# Patient Record
Sex: MALE | Race: WHITE | NOT HISPANIC OR LATINO | Employment: FULL TIME | URBAN - METROPOLITAN AREA
[De-identification: names, ages, dates, MRNs, and addresses within clinical notes are randomized per-mention and may not be internally consistent; named-entity substitution may affect disease eponyms.]

---

## 2020-11-20 ENCOUNTER — HOSPITAL ENCOUNTER (EMERGENCY)
Facility: HOSPITAL | Age: 30
Discharge: HOME | End: 2020-11-21
Attending: EMERGENCY MEDICINE | Admitting: EMERGENCY MEDICINE

## 2020-11-20 VITALS
SYSTOLIC BLOOD PRESSURE: 139 MMHG | HEART RATE: 119 BPM | TEMPERATURE: 97.8 F | OXYGEN SATURATION: 94 % | HEIGHT: 68 IN | WEIGHT: 180 LBS | RESPIRATION RATE: 20 BRPM | BODY MASS INDEX: 27.28 KG/M2 | DIASTOLIC BLOOD PRESSURE: 102 MMHG

## 2020-11-20 DIAGNOSIS — F10.10 ALCOHOL ABUSE: Primary | ICD-10-CM

## 2020-11-20 DIAGNOSIS — R74.8 ELEVATED LIVER ENZYMES: ICD-10-CM

## 2020-11-20 LAB
ALBUMIN SERPL-MCNC: 5 G/DL (ref 3.5–5.2)
ALBUMIN/GLOB SERPL: 1.7 G/DL
ALP SERPL-CCNC: 104 U/L (ref 39–117)
ALT SERPL W P-5'-P-CCNC: 61 U/L (ref 1–41)
AMPHET+METHAMPHET UR QL: NEGATIVE
AMPHETAMINES UR QL: NEGATIVE
ANION GAP SERPL CALCULATED.3IONS-SCNC: 15 MMOL/L (ref 5–15)
AST SERPL-CCNC: 68 U/L (ref 1–40)
BACTERIA UR QL AUTO: ABNORMAL /HPF
BARBITURATES UR QL SCN: NEGATIVE
BASOPHILS # BLD AUTO: 0.05 10*3/MM3 (ref 0–0.2)
BASOPHILS NFR BLD AUTO: 0.9 % (ref 0–1.5)
BENZODIAZ UR QL SCN: NEGATIVE
BILIRUB SERPL-MCNC: 0.8 MG/DL (ref 0–1.2)
BILIRUB UR QL STRIP: ABNORMAL
BUN SERPL-MCNC: 9 MG/DL (ref 6–20)
BUN/CREAT SERPL: 9.8 (ref 7–25)
BUPRENORPHINE SERPL-MCNC: NEGATIVE NG/ML
CALCIUM SPEC-SCNC: 9.8 MG/DL (ref 8.6–10.5)
CANNABINOIDS SERPL QL: POSITIVE
CHLORIDE SERPL-SCNC: 99 MMOL/L (ref 98–107)
CLARITY UR: ABNORMAL
CO2 SERPL-SCNC: 22 MMOL/L (ref 22–29)
COCAINE UR QL: NEGATIVE
COLOR UR: ABNORMAL
CREAT SERPL-MCNC: 0.92 MG/DL (ref 0.76–1.27)
DEPRECATED RDW RBC AUTO: 41.3 FL (ref 37–54)
EOSINOPHIL # BLD AUTO: 0.03 10*3/MM3 (ref 0–0.4)
EOSINOPHIL NFR BLD AUTO: 0.6 % (ref 0.3–6.2)
ERYTHROCYTE [DISTWIDTH] IN BLOOD BY AUTOMATED COUNT: 12.5 % (ref 12.3–15.4)
ETHANOL BLD-MCNC: <10 MG/DL (ref 0–10)
GFR SERPL CREATININE-BSD FRML MDRD: 118 ML/MIN/1.73
GFR SERPL CREATININE-BSD FRML MDRD: 97 ML/MIN/1.73
GLOBULIN UR ELPH-MCNC: 3 GM/DL
GLUCOSE SERPL-MCNC: 133 MG/DL (ref 65–99)
GLUCOSE UR STRIP-MCNC: NEGATIVE MG/DL
HCT VFR BLD AUTO: 51.6 % (ref 37.5–51)
HGB BLD-MCNC: 17.6 G/DL (ref 13–17.7)
HGB UR QL STRIP.AUTO: ABNORMAL
HOLD SPECIMEN: NORMAL
HOLD SPECIMEN: NORMAL
HYALINE CASTS UR QL AUTO: ABNORMAL /LPF
IMM GRANULOCYTES # BLD AUTO: 0.02 10*3/MM3 (ref 0–0.05)
IMM GRANULOCYTES NFR BLD AUTO: 0.4 % (ref 0–0.5)
KETONES UR QL STRIP: ABNORMAL
LEUKOCYTE ESTERASE UR QL STRIP.AUTO: ABNORMAL
LIPASE SERPL-CCNC: 15 U/L (ref 13–60)
LYMPHOCYTES # BLD AUTO: 0.98 10*3/MM3 (ref 0.7–3.1)
LYMPHOCYTES NFR BLD AUTO: 18.5 % (ref 19.6–45.3)
MCH RBC QN AUTO: 31 PG (ref 26.6–33)
MCHC RBC AUTO-ENTMCNC: 34.1 G/DL (ref 31.5–35.7)
MCV RBC AUTO: 90.8 FL (ref 79–97)
METHADONE UR QL SCN: NEGATIVE
MONOCYTES # BLD AUTO: 0.53 10*3/MM3 (ref 0.1–0.9)
MONOCYTES NFR BLD AUTO: 10 % (ref 5–12)
MUCOUS THREADS URNS QL MICRO: ABNORMAL /HPF
NEUTROPHILS NFR BLD AUTO: 3.69 10*3/MM3 (ref 1.7–7)
NEUTROPHILS NFR BLD AUTO: 69.6 % (ref 42.7–76)
NITRITE UR QL STRIP: NEGATIVE
NRBC BLD AUTO-RTO: 0 /100 WBC (ref 0–0.2)
OPIATES UR QL: NEGATIVE
OXYCODONE UR QL SCN: NEGATIVE
PCP UR QL SCN: NEGATIVE
PH UR STRIP.AUTO: 5.5 [PH] (ref 5–8)
PLATELET # BLD AUTO: 268 10*3/MM3 (ref 140–450)
PMV BLD AUTO: 8.8 FL (ref 6–12)
POTASSIUM SERPL-SCNC: 3.8 MMOL/L (ref 3.5–5.2)
PROPOXYPH UR QL: NEGATIVE
PROT SERPL-MCNC: 8 G/DL (ref 6–8.5)
PROT UR QL STRIP: ABNORMAL
RBC # BLD AUTO: 5.68 10*6/MM3 (ref 4.14–5.8)
RBC # UR: ABNORMAL /HPF
REF LAB TEST METHOD: ABNORMAL
SODIUM SERPL-SCNC: 136 MMOL/L (ref 136–145)
SP GR UR STRIP: 1.03 (ref 1–1.03)
SQUAMOUS #/AREA URNS HPF: ABNORMAL /HPF
TRICYCLICS UR QL SCN: NEGATIVE
UROBILINOGEN UR QL STRIP: ABNORMAL
WBC # BLD AUTO: 5.3 10*3/MM3 (ref 3.4–10.8)
WBC UR QL AUTO: ABNORMAL /HPF
WHOLE BLOOD HOLD SPECIMEN: NORMAL
WHOLE BLOOD HOLD SPECIMEN: NORMAL

## 2020-11-20 PROCEDURE — 96365 THER/PROPH/DIAG IV INF INIT: CPT

## 2020-11-20 PROCEDURE — 80053 COMPREHEN METABOLIC PANEL: CPT | Performed by: EMERGENCY MEDICINE

## 2020-11-20 PROCEDURE — 99283 EMERGENCY DEPT VISIT LOW MDM: CPT

## 2020-11-20 PROCEDURE — 85025 COMPLETE CBC W/AUTO DIFF WBC: CPT | Performed by: EMERGENCY MEDICINE

## 2020-11-20 PROCEDURE — 83690 ASSAY OF LIPASE: CPT | Performed by: EMERGENCY MEDICINE

## 2020-11-20 PROCEDURE — 25010000002 THIAMINE PER 100 MG: Performed by: EMERGENCY MEDICINE

## 2020-11-20 PROCEDURE — 81001 URINALYSIS AUTO W/SCOPE: CPT | Performed by: EMERGENCY MEDICINE

## 2020-11-20 PROCEDURE — 25010000002 MAGNESIUM SULFATE PER 500 MG OF MAGNESIUM: Performed by: EMERGENCY MEDICINE

## 2020-11-20 PROCEDURE — 80307 DRUG TEST PRSMV CHEM ANLYZR: CPT

## 2020-11-20 RX ADMIN — THIAMINE HYDROCHLORIDE 1000 ML/HR: 100 INJECTION, SOLUTION INTRAMUSCULAR; INTRAVENOUS at 20:43

## 2020-11-21 NOTE — ED PROVIDER NOTES
"Subjective   Mr. Robosn Miller is a 29 y.o. male who presents to the ED with c/o alcohol abuse. He reports he has been drinking alcohol for several years and has been drinking more over the past 2 months. He reports he has been drinking 1/2 a fifth of alcohol daily or more. He has been experiencing abdominal swelling and discomfort. He presents today because he is concerned for liver disease. He does not express desire to stop drinking. He has had a weight gain recently. He denies suicidal ideation, homicidal ideation, nausea, vomiting, chest pain, or shortness of breath. There are no other acute symptoms at this time.      History provided by:  Patient  Alcohol Problem  Location:  Abdominal  Severity:  Moderate  Onset quality:  Sudden  Timing:  Constant  Progression:  Worsening  Chronicity:  Chronic  Relieved by:  None tried  Worsened by:  Nothing  Ineffective treatments:  None tried  Associated symptoms: abdominal pain    Associated symptoms: no chest pain, no cough, no fever, no nausea, no shortness of breath and no vomiting        Review of Systems   Constitutional: Negative for chills and fever.   Respiratory: Negative for cough and shortness of breath.    Cardiovascular: Negative for chest pain.   Gastrointestinal: Positive for abdominal pain. Negative for nausea and vomiting.   Genitourinary: Negative for dysuria and frequency.   Neurological: Negative for dizziness and weakness.   Psychiatric/Behavioral: Negative for suicidal ideas.   All other systems reviewed and are negative.      History reviewed. No pertinent past medical history.    Allergies   Allergen Reactions   • Ceclor [Cefaclor] Other (See Comments)     \" I DONT KNOW I WAS AN INFANT WHEN I GOT IT\"    • Doxycycline Other (See Comments)     \"MAKES ME FEEL VERY ILL\"   • Penicillins Other (See Comments)     \"I DO NOT KNOW I WAS GIVEN THIS AS A CHILD\"    • Sulfa Antibiotics Other (See Comments)     \" I DONT KNOW I WAS AN INFANT WHEN I WAS GIVEN IT\"  "       Past Surgical History:   Procedure Laterality Date   • WRIST FRACTURE SURGERY         History reviewed. No pertinent family history.    Social History     Socioeconomic History   • Marital status: Single     Spouse name: Not on file   • Number of children: Not on file   • Years of education: Not on file   • Highest education level: Not on file   Tobacco Use   • Smoking status: Current Every Day Smoker     Packs/day: 0.50     Types: Cigarettes   Substance and Sexual Activity   • Alcohol use: Yes     Comment: HALF A FIFTH OR MORE DAILY FOR THE PAST COUPLE MONTHS.    • Drug use: Yes     Types: Marijuana     Comment: DAILY         Objective   Physical Exam  Vitals signs and nursing note reviewed.   Constitutional:       General: He is not in acute distress.     Appearance: Normal appearance. He is well-developed. He is not ill-appearing or toxic-appearing.   HENT:      Head: Normocephalic and atraumatic.      Nose: Nose normal.      Mouth/Throat:      Mouth: Mucous membranes are moist.   Eyes:      General: Lids are normal.      Extraocular Movements: Extraocular movements intact.      Conjunctiva/sclera: Conjunctivae normal.      Pupils: Pupils are equal, round, and reactive to light.   Neck:      Musculoskeletal: Full passive range of motion without pain and normal range of motion.      Trachea: Trachea normal.   Cardiovascular:      Rate and Rhythm: Regular rhythm.      Pulses: Normal pulses.      Heart sounds: Normal heart sounds.   Pulmonary:      Effort: Pulmonary effort is normal. No respiratory distress.      Breath sounds: Normal breath sounds. No decreased breath sounds, wheezing, rhonchi or rales.   Abdominal:      General: Bowel sounds are normal.      Palpations: Abdomen is soft.      Tenderness: There is no abdominal tenderness.   Musculoskeletal: Normal range of motion.   Skin:     General: Skin is warm and dry.      Findings: No rash.   Neurological:      Mental Status: He is alert and oriented to  person, place, and time.      Cranial Nerves: No cranial nerve deficit.   Psychiatric:         Speech: Speech normal.         Behavior: Behavior normal. Behavior is cooperative.         Procedures         ED Course  ED Course as of Nov 21 0351 Fri Nov 20, 2020 2100 ALT (SGPT)(!): 61 [KG]   2100 AST (SGOT)(!): 68 [KG]   2126 WBC, UA(!): 6-12 [KG]   2126 Leukocytes, UA(!): Trace [KG]   2130 Results are discussed with patient at this time.  Patient will be discharged home.  Patient to follow-up with GI as needed.  Encouraged to stop drinking alcohol.  Patient to return to the ED if he decides he would like treatment for his alcohol abuse.  Patient agrees with treatment plan and verbalized understanding.    [KG]      ED Course User Index  [KG] Rina Tracy, ANAID     Recent Results (from the past 24 hour(s))   Ethanol    Collection Time: 11/20/20  5:01 PM    Specimen: Blood   Result Value Ref Range    Ethanol <10 0 - 10 mg/dL   Light Blue Top    Collection Time: 11/20/20  5:01 PM   Result Value Ref Range    Extra Tube hold for add-on    Green Top (Gel)    Collection Time: 11/20/20  5:01 PM   Result Value Ref Range    Extra Tube Hold for add-ons.    Lavender Top    Collection Time: 11/20/20  5:01 PM   Result Value Ref Range    Extra Tube hold for add-on    Gold Top - SST    Collection Time: 11/20/20  5:01 PM   Result Value Ref Range    Extra Tube Hold for add-ons.    Comprehensive Metabolic Panel    Collection Time: 11/20/20  5:01 PM    Specimen: Blood   Result Value Ref Range    Glucose 133 (H) 65 - 99 mg/dL    BUN 9 6 - 20 mg/dL    Creatinine 0.92 0.76 - 1.27 mg/dL    Sodium 136 136 - 145 mmol/L    Potassium 3.8 3.5 - 5.2 mmol/L    Chloride 99 98 - 107 mmol/L    CO2 22.0 22.0 - 29.0 mmol/L    Calcium 9.8 8.6 - 10.5 mg/dL    Total Protein 8.0 6.0 - 8.5 g/dL    Albumin 5.00 3.50 - 5.20 g/dL    ALT (SGPT) 61 (H) 1 - 41 U/L    AST (SGOT) 68 (H) 1 - 40 U/L    Alkaline Phosphatase 104 39 - 117 U/L    Total Bilirubin  0.8 0.0 - 1.2 mg/dL    eGFR Non African Amer 97 >60 mL/min/1.73    eGFR  African Amer 118 >60 mL/min/1.73    Globulin 3.0 gm/dL    A/G Ratio 1.7 g/dL    BUN/Creatinine Ratio 9.8 7.0 - 25.0    Anion Gap 15.0 5.0 - 15.0 mmol/L   Lipase    Collection Time: 11/20/20  5:01 PM    Specimen: Blood   Result Value Ref Range    Lipase 15 13 - 60 U/L   CBC Auto Differential    Collection Time: 11/20/20  5:01 PM    Specimen: Blood   Result Value Ref Range    WBC 5.30 3.40 - 10.80 10*3/mm3    RBC 5.68 4.14 - 5.80 10*6/mm3    Hemoglobin 17.6 13.0 - 17.7 g/dL    Hematocrit 51.6 (H) 37.5 - 51.0 %    MCV 90.8 79.0 - 97.0 fL    MCH 31.0 26.6 - 33.0 pg    MCHC 34.1 31.5 - 35.7 g/dL    RDW 12.5 12.3 - 15.4 %    RDW-SD 41.3 37.0 - 54.0 fl    MPV 8.8 6.0 - 12.0 fL    Platelets 268 140 - 450 10*3/mm3    Neutrophil % 69.6 42.7 - 76.0 %    Lymphocyte % 18.5 (L) 19.6 - 45.3 %    Monocyte % 10.0 5.0 - 12.0 %    Eosinophil % 0.6 0.3 - 6.2 %    Basophil % 0.9 0.0 - 1.5 %    Immature Grans % 0.4 0.0 - 0.5 %    Neutrophils, Absolute 3.69 1.70 - 7.00 10*3/mm3    Lymphocytes, Absolute 0.98 0.70 - 3.10 10*3/mm3    Monocytes, Absolute 0.53 0.10 - 0.90 10*3/mm3    Eosinophils, Absolute 0.03 0.00 - 0.40 10*3/mm3    Basophils, Absolute 0.05 0.00 - 0.20 10*3/mm3    Immature Grans, Absolute 0.02 0.00 - 0.05 10*3/mm3    nRBC 0.0 0.0 - 0.2 /100 WBC   Urine Drug Screen - Urine, Clean Catch    Collection Time: 11/20/20  8:33 PM    Specimen: Urine, Clean Catch   Result Value Ref Range    THC, Screen, Urine Positive (A) Negative    Phencyclidine (PCP), Urine Negative Negative    Cocaine Screen, Urine Negative Negative    Methamphetamine, Ur Negative Negative    Opiate Screen Negative Negative    Amphetamine Screen, Urine Negative Negative    Benzodiazepine Screen, Urine Negative Negative    Tricyclic Antidepressants Screen Negative Negative    Methadone Screen, Urine Negative Negative    Barbiturates Screen, Urine Negative Negative    Oxycodone Screen, Urine  Negative Negative    Propoxyphene Screen Negative Negative    Buprenorphine, Screen, Urine Negative Negative   Urinalysis With Microscopic If Indicated (No Culture) - Urine, Clean Catch    Collection Time: 11/20/20  8:33 PM    Specimen: Urine, Clean Catch   Result Value Ref Range    Color, UA Orange (A) Yellow, Straw    Appearance, UA Cloudy (A) Clear    pH, UA 5.5 5.0 - 8.0    Specific Gravity, UA 1.035 (H) 1.001 - 1.030    Glucose, UA Negative Negative    Ketones, UA 15 mg/dL (1+) (A) Negative    Bilirubin, UA Moderate (2+) (A) Negative    Blood, UA Trace (A) Negative    Protein,  mg/dL (2+) (A) Negative    Leuk Esterase, UA Trace (A) Negative    Nitrite, UA Negative Negative    Urobilinogen, UA 1.0 E.U./dL 0.2 - 1.0 E.U./dL   Urinalysis, Microscopic Only - Urine, Clean Catch    Collection Time: 11/20/20  8:33 PM    Specimen: Urine, Clean Catch   Result Value Ref Range    RBC, UA 3-6 (A) None Seen, 0-2 /HPF    WBC, UA 6-12 (A) None Seen, 0-2 /HPF    Bacteria, UA None Seen None Seen, Trace /HPF    Squamous Epithelial Cells, UA 3-6 (A) None Seen, 0-2 /HPF    Hyaline Casts, UA Too Numerous to Count 0 - 6 /LPF    Mucus, UA Large/3+ (A) None Seen, Trace /HPF    Methodology Manual Light Microscopy      Note: In addition to lab results from this visit, the labs listed above may include labs taken at another facility or during a different encounter within the last 24 hours. Please correlate lab times with ED admission and discharge times for further clarification of the services performed during this visit.    No orders to display     Vitals:    11/20/20 2101 11/20/20 2129 11/20/20 2130 11/20/20 2200   BP:   (!) 149/110 (!) 139/102   BP Location:       Patient Position:       Pulse:       Resp:       Temp:       SpO2: 95% 94%  94%   Weight:       Height:         Medications   multiple vitamin (M.V.I. Adult) 10 mL, thiamine (B-1) 100 mg, folic acid 1 mg, magnesium sulfate 2 g in sodium chloride 0.9 % 1,000 mL  infusion (0 mL/hr Intravenous Stopped 11/20/20 1741)     ECG/EMG Results (last 24 hours)     ** No results found for the last 24 hours. **        No orders to display              COVID-19 RISK SCREEN     1. Has the patient had close contact without PPE with a lab confirmed COVID-19 (+) person or a person under investigation (PUI) for COVID-19 infection?  -- No     2. Has the patient had respiratory symptoms, worsened/new cough and/or SOA, unexplained fever, or sudden loss of smell and/or taste in the past 7 days? --  No    3. Does the patient have baseline higher exposure risk such as working in healthcare field, currently residing in healthcare facility, or ongoing hemodialysis?  --  No                                     MDM    Final diagnoses:   Alcohol abuse   Elevated liver enzymes       Documentation assistance provided by ajit Bryan.  Information recorded by the scribe was done at my direction and has been verified and validated by me.     Gonzalez Bryan  11/20/20 7499       Rina Tracy, APRN  11/21/20 5121

## 2023-03-30 ENCOUNTER — APPOINTMENT (OUTPATIENT)
Dept: CT IMAGING | Facility: HOSPITAL | Age: 33
DRG: 439 | End: 2023-03-30
Payer: COMMERCIAL

## 2023-03-30 ENCOUNTER — HOSPITAL ENCOUNTER (INPATIENT)
Facility: HOSPITAL | Age: 33
LOS: 14 days | Discharge: HOME | DRG: 439 | End: 2023-04-13
Attending: EMERGENCY MEDICINE | Admitting: INTERNAL MEDICINE
Payer: COMMERCIAL

## 2023-03-30 DIAGNOSIS — F10.939 ALCOHOL WITHDRAWAL SYNDROME WITH COMPLICATION: ICD-10-CM

## 2023-03-30 DIAGNOSIS — K85.20 ALCOHOL-INDUCED ACUTE PANCREATITIS, UNSPECIFIED COMPLICATION STATUS: Primary | ICD-10-CM

## 2023-03-30 DIAGNOSIS — E87.1 HYPONATREMIA: ICD-10-CM

## 2023-03-30 PROBLEM — E87.20 LACTIC ACIDOSIS: Status: ACTIVE | Noted: 2023-03-30

## 2023-03-30 PROBLEM — K85.90 SEVERE ACUTE PANCREATITIS: Status: ACTIVE | Noted: 2023-03-30

## 2023-03-30 LAB
ALBUMIN SERPL-MCNC: 4.7 G/DL (ref 3.5–5.2)
ALBUMIN/GLOB SERPL: 1.7 G/DL
ALP SERPL-CCNC: 123 U/L (ref 39–117)
ALT SERPL W P-5'-P-CCNC: 20 U/L (ref 1–41)
ANION GAP SERPL CALCULATED.3IONS-SCNC: 22 MMOL/L (ref 5–15)
AST SERPL-CCNC: 37 U/L (ref 1–40)
BACTERIA UR QL AUTO: ABNORMAL /HPF
BASOPHILS # BLD AUTO: 0.06 10*3/MM3 (ref 0–0.2)
BASOPHILS NFR BLD AUTO: 0.4 % (ref 0–1.5)
BILIRUB SERPL-MCNC: 1.5 MG/DL (ref 0–1.2)
BILIRUB UR QL STRIP: ABNORMAL
BUN SERPL-MCNC: 6 MG/DL (ref 6–20)
BUN/CREAT SERPL: 7 (ref 7–25)
CALCIUM SPEC-SCNC: 10.4 MG/DL (ref 8.6–10.5)
CHLORIDE SERPL-SCNC: 90 MMOL/L (ref 98–107)
CLARITY UR: ABNORMAL
CO2 SERPL-SCNC: 19 MMOL/L (ref 22–29)
COD CRY URNS QL: ABNORMAL /HPF
COLOR UR: ABNORMAL
CREAT SERPL-MCNC: 0.86 MG/DL (ref 0.76–1.27)
D-LACTATE SERPL-SCNC: 1.2 MMOL/L (ref 0.5–2)
D-LACTATE SERPL-SCNC: 4.4 MMOL/L (ref 0.5–2)
DEPRECATED RDW RBC AUTO: 47.5 FL (ref 37–54)
EGFRCR SERPLBLD CKD-EPI 2021: 118 ML/MIN/1.73
EOSINOPHIL # BLD AUTO: 0.01 10*3/MM3 (ref 0–0.4)
EOSINOPHIL NFR BLD AUTO: 0.1 % (ref 0.3–6.2)
ERYTHROCYTE [DISTWIDTH] IN BLOOD BY AUTOMATED COUNT: 13.5 % (ref 12.3–15.4)
ETHANOL BLD-MCNC: 30 MG/DL (ref 0–10)
GLOBULIN UR ELPH-MCNC: 2.7 GM/DL
GLUCOSE SERPL-MCNC: 145 MG/DL (ref 65–99)
GLUCOSE UR STRIP-MCNC: ABNORMAL MG/DL
HBA1C MFR BLD: 5.3 % (ref 4.8–5.6)
HCT VFR BLD AUTO: 51 % (ref 37.5–51)
HGB BLD-MCNC: 17.8 G/DL (ref 13–17.7)
HGB UR QL STRIP.AUTO: ABNORMAL
HOLD SPECIMEN: NORMAL
HYALINE CASTS UR QL AUTO: ABNORMAL /LPF
IMM GRANULOCYTES # BLD AUTO: 0.12 10*3/MM3 (ref 0–0.05)
IMM GRANULOCYTES NFR BLD AUTO: 0.8 % (ref 0–0.5)
KETONES UR QL STRIP: ABNORMAL
LEUKOCYTE ESTERASE UR QL STRIP.AUTO: NEGATIVE
LIPASE SERPL-CCNC: 656 U/L (ref 13–60)
LYMPHOCYTES # BLD AUTO: 0.95 10*3/MM3 (ref 0.7–3.1)
LYMPHOCYTES NFR BLD AUTO: 6.1 % (ref 19.6–45.3)
MCH RBC QN AUTO: 33.5 PG (ref 26.6–33)
MCHC RBC AUTO-ENTMCNC: 34.9 G/DL (ref 31.5–35.7)
MCV RBC AUTO: 95.9 FL (ref 79–97)
MONOCYTES # BLD AUTO: 1.08 10*3/MM3 (ref 0.1–0.9)
MONOCYTES NFR BLD AUTO: 7 % (ref 5–12)
NEUTROPHILS NFR BLD AUTO: 13.31 10*3/MM3 (ref 1.7–7)
NEUTROPHILS NFR BLD AUTO: 85.6 % (ref 42.7–76)
NITRITE UR QL STRIP: POSITIVE
NRBC BLD AUTO-RTO: 0 /100 WBC (ref 0–0.2)
PH UR STRIP.AUTO: 5.5 [PH] (ref 5–8)
PLATELET # BLD AUTO: 317 10*3/MM3 (ref 140–450)
PMV BLD AUTO: 8.6 FL (ref 6–12)
POTASSIUM SERPL-SCNC: 3.7 MMOL/L (ref 3.5–5.2)
PROT SERPL-MCNC: 7.4 G/DL (ref 6–8.5)
PROT UR QL STRIP: ABNORMAL
RBC # BLD AUTO: 5.32 10*6/MM3 (ref 4.14–5.8)
RBC # UR STRIP: ABNORMAL /HPF
REF LAB TEST METHOD: ABNORMAL
SODIUM SERPL-SCNC: 131 MMOL/L (ref 136–145)
SP GR UR STRIP: 1.04 (ref 1–1.03)
SPERM URNS QL MICRO: ABNORMAL /HPF
SQUAMOUS #/AREA URNS HPF: ABNORMAL /HPF
UROBILINOGEN UR QL STRIP: ABNORMAL
WBC # UR STRIP: ABNORMAL /HPF
WBC NRBC COR # BLD: 15.53 10*3/MM3 (ref 3.4–10.8)
WHOLE BLOOD HOLD COAG: NORMAL
WHOLE BLOOD HOLD SPECIMEN: NORMAL

## 2023-03-30 PROCEDURE — 25010000002 HYDROMORPHONE PER 4 MG: Performed by: FAMILY MEDICINE

## 2023-03-30 PROCEDURE — 99254 IP/OBS CNSLTJ NEW/EST MOD 60: CPT | Performed by: NURSE PRACTITIONER

## 2023-03-30 PROCEDURE — 80053 COMPREHEN METABOLIC PANEL: CPT | Performed by: EMERGENCY MEDICINE

## 2023-03-30 PROCEDURE — 83605 ASSAY OF LACTIC ACID: CPT | Performed by: PHYSICIAN ASSISTANT

## 2023-03-30 PROCEDURE — 25010000002 KETOROLAC TROMETHAMINE PER 15 MG: Performed by: PHYSICIAN ASSISTANT

## 2023-03-30 PROCEDURE — 63710000001 ONDANSETRON PER 8 MG: Performed by: FAMILY MEDICINE

## 2023-03-30 PROCEDURE — 25010000002 PROCHLORPERAZINE 10 MG/2ML SOLUTION: Performed by: PHYSICIAN ASSISTANT

## 2023-03-30 PROCEDURE — 85025 COMPLETE CBC W/AUTO DIFF WBC: CPT | Performed by: EMERGENCY MEDICINE

## 2023-03-30 PROCEDURE — 25010000002 KETOROLAC TROMETHAMINE PER 15 MG: Performed by: FAMILY MEDICINE

## 2023-03-30 PROCEDURE — 99223 1ST HOSP IP/OBS HIGH 75: CPT | Performed by: FAMILY MEDICINE

## 2023-03-30 PROCEDURE — 99285 EMERGENCY DEPT VISIT HI MDM: CPT

## 2023-03-30 PROCEDURE — 82077 ASSAY SPEC XCP UR&BREATH IA: CPT | Performed by: PHYSICIAN ASSISTANT

## 2023-03-30 PROCEDURE — 25010000002 ENOXAPARIN PER 10 MG: Performed by: FAMILY MEDICINE

## 2023-03-30 PROCEDURE — 81001 URINALYSIS AUTO W/SCOPE: CPT | Performed by: EMERGENCY MEDICINE

## 2023-03-30 PROCEDURE — 83690 ASSAY OF LIPASE: CPT | Performed by: EMERGENCY MEDICINE

## 2023-03-30 PROCEDURE — 74176 CT ABD & PELVIS W/O CONTRAST: CPT

## 2023-03-30 PROCEDURE — 83036 HEMOGLOBIN GLYCOSYLATED A1C: CPT | Performed by: FAMILY MEDICINE

## 2023-03-30 RX ORDER — DIAZEPAM 5 MG/1
10 TABLET ORAL EVERY 6 HOURS
Status: DISCONTINUED | OUTPATIENT
Start: 2023-03-30 | End: 2023-04-01

## 2023-03-30 RX ORDER — SODIUM CHLORIDE, SODIUM LACTATE, POTASSIUM CHLORIDE, CALCIUM CHLORIDE 600; 310; 30; 20 MG/100ML; MG/100ML; MG/100ML; MG/100ML
300 INJECTION, SOLUTION INTRAVENOUS CONTINUOUS
Status: DISCONTINUED | OUTPATIENT
Start: 2023-03-30 | End: 2023-03-30

## 2023-03-30 RX ORDER — ACETAMINOPHEN 325 MG/1
650 TABLET ORAL EVERY 4 HOURS PRN
Status: DISCONTINUED | OUTPATIENT
Start: 2023-03-30 | End: 2023-04-01

## 2023-03-30 RX ORDER — SODIUM CHLORIDE 0.9 % (FLUSH) 0.9 %
10 SYRINGE (ML) INJECTION EVERY 12 HOURS SCHEDULED
Status: DISCONTINUED | OUTPATIENT
Start: 2023-03-30 | End: 2023-04-13 | Stop reason: HOSPADM

## 2023-03-30 RX ORDER — THIAMINE HYDROCHLORIDE 100 MG/ML
100 INJECTION, SOLUTION INTRAMUSCULAR; INTRAVENOUS ONCE
Status: COMPLETED | OUTPATIENT
Start: 2023-03-30 | End: 2023-03-30

## 2023-03-30 RX ORDER — CLONIDINE HYDROCHLORIDE 0.1 MG/1
0.1 TABLET ORAL ONCE
Status: COMPLETED | OUTPATIENT
Start: 2023-03-30 | End: 2023-03-30

## 2023-03-30 RX ORDER — LORAZEPAM 2 MG/ML
1 INJECTION INTRAMUSCULAR
Status: DISCONTINUED | OUTPATIENT
Start: 2023-03-30 | End: 2023-04-05

## 2023-03-30 RX ORDER — SODIUM CHLORIDE, SODIUM LACTATE, POTASSIUM CHLORIDE, CALCIUM CHLORIDE 600; 310; 30; 20 MG/100ML; MG/100ML; MG/100ML; MG/100ML
150 INJECTION, SOLUTION INTRAVENOUS CONTINUOUS
Status: DISCONTINUED | OUTPATIENT
Start: 2023-03-31 | End: 2023-03-31

## 2023-03-30 RX ORDER — BISACODYL 5 MG/1
5 TABLET, DELAYED RELEASE ORAL DAILY PRN
Status: DISCONTINUED | OUTPATIENT
Start: 2023-03-30 | End: 2023-04-13 | Stop reason: HOSPADM

## 2023-03-30 RX ORDER — POLYETHYLENE GLYCOL 3350 17 G/17G
17 POWDER, FOR SOLUTION ORAL DAILY PRN
Status: DISCONTINUED | OUTPATIENT
Start: 2023-03-30 | End: 2023-04-13 | Stop reason: HOSPADM

## 2023-03-30 RX ORDER — ENOXAPARIN SODIUM 100 MG/ML
40 INJECTION SUBCUTANEOUS NIGHTLY
Status: DISCONTINUED | OUTPATIENT
Start: 2023-03-30 | End: 2023-04-09

## 2023-03-30 RX ORDER — ONDANSETRON 4 MG/1
4 TABLET, FILM COATED ORAL EVERY 6 HOURS PRN
Status: DISCONTINUED | OUTPATIENT
Start: 2023-03-30 | End: 2023-04-13 | Stop reason: HOSPADM

## 2023-03-30 RX ORDER — SODIUM CHLORIDE 9 MG/ML
10 INJECTION INTRAVENOUS AS NEEDED
Status: DISCONTINUED | OUTPATIENT
Start: 2023-03-30 | End: 2023-04-06

## 2023-03-30 RX ORDER — BISACODYL 10 MG
10 SUPPOSITORY, RECTAL RECTAL DAILY PRN
Status: DISCONTINUED | OUTPATIENT
Start: 2023-03-30 | End: 2023-04-13 | Stop reason: HOSPADM

## 2023-03-30 RX ORDER — SODIUM CHLORIDE, SODIUM LACTATE, POTASSIUM CHLORIDE, CALCIUM CHLORIDE 600; 310; 30; 20 MG/100ML; MG/100ML; MG/100ML; MG/100ML
300 INJECTION, SOLUTION INTRAVENOUS CONTINUOUS
Status: ACTIVE | OUTPATIENT
Start: 2023-03-30 | End: 2023-03-31

## 2023-03-30 RX ORDER — ACETAMINOPHEN 160 MG/5ML
650 SOLUTION ORAL EVERY 4 HOURS PRN
Status: DISCONTINUED | OUTPATIENT
Start: 2023-03-30 | End: 2023-04-01

## 2023-03-30 RX ORDER — LORAZEPAM 1 MG/1
2 TABLET ORAL
Status: DISCONTINUED | OUTPATIENT
Start: 2023-03-30 | End: 2023-04-05

## 2023-03-30 RX ORDER — LORAZEPAM 2 MG/ML
2 INJECTION INTRAMUSCULAR
Status: DISCONTINUED | OUTPATIENT
Start: 2023-03-30 | End: 2023-04-05

## 2023-03-30 RX ORDER — KETOROLAC TROMETHAMINE 30 MG/ML
30 INJECTION, SOLUTION INTRAMUSCULAR; INTRAVENOUS EVERY 6 HOURS PRN
Status: DISPENSED | OUTPATIENT
Start: 2023-03-30 | End: 2023-04-04

## 2023-03-30 RX ORDER — CLONIDINE HYDROCHLORIDE 0.1 MG/1
0.1 TABLET ORAL EVERY 6 HOURS SCHEDULED
Status: DISCONTINUED | OUTPATIENT
Start: 2023-03-30 | End: 2023-04-01

## 2023-03-30 RX ORDER — KETOROLAC TROMETHAMINE 30 MG/ML
15 INJECTION, SOLUTION INTRAMUSCULAR; INTRAVENOUS ONCE
Status: COMPLETED | OUTPATIENT
Start: 2023-03-30 | End: 2023-03-30

## 2023-03-30 RX ORDER — FAMOTIDINE 10 MG/ML
20 INJECTION, SOLUTION INTRAVENOUS ONCE
Status: COMPLETED | OUTPATIENT
Start: 2023-03-30 | End: 2023-03-30

## 2023-03-30 RX ORDER — AMOXICILLIN 250 MG
2 CAPSULE ORAL 2 TIMES DAILY
Status: DISCONTINUED | OUTPATIENT
Start: 2023-03-30 | End: 2023-04-13 | Stop reason: HOSPADM

## 2023-03-30 RX ORDER — PROCHLORPERAZINE EDISYLATE 5 MG/ML
5 INJECTION INTRAMUSCULAR; INTRAVENOUS EVERY 6 HOURS PRN
Status: DISCONTINUED | OUTPATIENT
Start: 2023-03-30 | End: 2023-04-13 | Stop reason: HOSPADM

## 2023-03-30 RX ORDER — SODIUM CHLORIDE 9 MG/ML
40 INJECTION, SOLUTION INTRAVENOUS AS NEEDED
Status: DISCONTINUED | OUTPATIENT
Start: 2023-03-30 | End: 2023-04-13 | Stop reason: HOSPADM

## 2023-03-30 RX ORDER — KETOROLAC TROMETHAMINE 15 MG/ML
15 INJECTION, SOLUTION INTRAMUSCULAR; INTRAVENOUS ONCE AS NEEDED
Status: COMPLETED | OUTPATIENT
Start: 2023-03-30 | End: 2023-03-30

## 2023-03-30 RX ORDER — SODIUM CHLORIDE 9 MG/ML
250 INJECTION, SOLUTION INTRAVENOUS CONTINUOUS
Status: DISCONTINUED | OUTPATIENT
Start: 2023-03-30 | End: 2023-03-30

## 2023-03-30 RX ORDER — ONDANSETRON 2 MG/ML
4 INJECTION INTRAMUSCULAR; INTRAVENOUS EVERY 6 HOURS PRN
Status: DISCONTINUED | OUTPATIENT
Start: 2023-03-30 | End: 2023-04-13 | Stop reason: HOSPADM

## 2023-03-30 RX ORDER — HYDROMORPHONE HYDROCHLORIDE 1 MG/ML
0.5 INJECTION, SOLUTION INTRAMUSCULAR; INTRAVENOUS; SUBCUTANEOUS
Status: DISCONTINUED | OUTPATIENT
Start: 2023-03-30 | End: 2023-04-01

## 2023-03-30 RX ORDER — LORAZEPAM 1 MG/1
1 TABLET ORAL
Status: DISCONTINUED | OUTPATIENT
Start: 2023-03-30 | End: 2023-04-05

## 2023-03-30 RX ORDER — ACETAMINOPHEN 650 MG/1
650 SUPPOSITORY RECTAL EVERY 4 HOURS PRN
Status: DISCONTINUED | OUTPATIENT
Start: 2023-03-30 | End: 2023-04-01

## 2023-03-30 RX ORDER — SODIUM CHLORIDE 0.9 % (FLUSH) 0.9 %
10 SYRINGE (ML) INJECTION AS NEEDED
Status: DISCONTINUED | OUTPATIENT
Start: 2023-03-30 | End: 2023-04-13 | Stop reason: HOSPADM

## 2023-03-30 RX ADMIN — ENOXAPARIN SODIUM 40 MG: 40 INJECTION SUBCUTANEOUS at 20:55

## 2023-03-30 RX ADMIN — FAMOTIDINE 20 MG: 10 INJECTION INTRAVENOUS at 09:02

## 2023-03-30 RX ADMIN — HYDROMORPHONE HYDROCHLORIDE 0.5 MG: 1 INJECTION, SOLUTION INTRAMUSCULAR; INTRAVENOUS; SUBCUTANEOUS at 15:47

## 2023-03-30 RX ADMIN — KETOROLAC TROMETHAMINE 15 MG: 30 INJECTION, SOLUTION INTRAMUSCULAR; INTRAVENOUS at 08:37

## 2023-03-30 RX ADMIN — SODIUM CHLORIDE 1000 ML: 9 INJECTION, SOLUTION INTRAVENOUS at 08:37

## 2023-03-30 RX ADMIN — ONDANSETRON 4 MG: 4 TABLET ORAL at 18:01

## 2023-03-30 RX ADMIN — HYDROMORPHONE HYDROCHLORIDE 0.5 MG: 1 INJECTION, SOLUTION INTRAMUSCULAR; INTRAVENOUS; SUBCUTANEOUS at 20:59

## 2023-03-30 RX ADMIN — KETOROLAC TROMETHAMINE 15 MG: 15 INJECTION, SOLUTION INTRAMUSCULAR; INTRAVENOUS at 11:50

## 2023-03-30 RX ADMIN — DIAZEPAM 10 MG: 5 TABLET ORAL at 17:56

## 2023-03-30 RX ADMIN — CLONIDINE HYDROCHLORIDE 0.1 MG: 0.1 TABLET ORAL at 17:56

## 2023-03-30 RX ADMIN — SODIUM CHLORIDE, POTASSIUM CHLORIDE, SODIUM LACTATE AND CALCIUM CHLORIDE 300 ML/HR: 600; 310; 30; 20 INJECTION, SOLUTION INTRAVENOUS at 21:33

## 2023-03-30 RX ADMIN — SODIUM CHLORIDE, POTASSIUM CHLORIDE, SODIUM LACTATE AND CALCIUM CHLORIDE 300 ML/HR: 600; 310; 30; 20 INJECTION, SOLUTION INTRAVENOUS at 18:05

## 2023-03-30 RX ADMIN — SENNOSIDES AND DOCUSATE SODIUM 2 TABLET: 8.6; 5 TABLET ORAL at 20:55

## 2023-03-30 RX ADMIN — CLONIDINE HYDROCHLORIDE 0.1 MG: 0.1 TABLET ORAL at 11:21

## 2023-03-30 RX ADMIN — CLONIDINE HYDROCHLORIDE 0.1 MG: 0.1 TABLET ORAL at 23:55

## 2023-03-30 RX ADMIN — THIAMINE HCL TAB 100 MG 100 MG: 100 TAB at 14:45

## 2023-03-30 RX ADMIN — KETOROLAC TROMETHAMINE 30 MG: 30 INJECTION, SOLUTION INTRAMUSCULAR; INTRAVENOUS at 18:01

## 2023-03-30 RX ADMIN — DIAZEPAM 10 MG: 5 TABLET ORAL at 23:55

## 2023-03-30 RX ADMIN — Medication 10 ML: at 20:55

## 2023-03-30 RX ADMIN — KETOROLAC TROMETHAMINE 30 MG: 30 INJECTION, SOLUTION INTRAMUSCULAR; INTRAVENOUS at 23:58

## 2023-03-30 RX ADMIN — PROCHLORPERAZINE EDISYLATE 5 MG: 5 INJECTION INTRAMUSCULAR; INTRAVENOUS at 08:37

## 2023-03-30 RX ADMIN — SODIUM CHLORIDE, POTASSIUM CHLORIDE, SODIUM LACTATE AND CALCIUM CHLORIDE 300 ML/HR: 600; 310; 30; 20 INJECTION, SOLUTION INTRAVENOUS at 15:07

## 2023-03-30 NOTE — CASE MANAGEMENT/SOCIAL WORK
Discharge Planning Assessment  [facility]     Patient Name: Robson August  MRN: [id number]  Today's Date: 3/30/2023    Admit Date: 3/30/2023    Plan: Initial   Discharge Needs Assessment     Row Name 03/30/23 1107       Living Environment    People in Home alone    Current Living Arrangements apartment    Primary Care Provided by self    Provides Primary Care For no one    Family Caregiver if Needed parent(s)    Family Caregiver Names eloise august Father [phone]    Quality of Family Relationships helpful    Able to Return to Prior Arrangements yes       Resource/Environmental Concerns    Resource/Environmental Concerns none    Transportation Concerns none       Food Insecurity    Within the past 12 months, you worried that your food would run out before you got the money to buy more. Never true    Within the past 12 months, the food you bought just didn't last and you didn't have money to get more. Never true       Transition Planning    Patient/Family Anticipates Transition to home    Patient/Family Anticipated Services at Transition other (see comments)  CDT    Transportation Anticipated family or friend will provide       Discharge Needs Assessment    Readmission Within the Last 30 Days no previous admission in last 30 days    Equipment Currently Used at Home none    Concerns to be Addressed denies needs/concerns at this time;substance/tobacco abuse/use    Anticipated Changes Related to Illness none    Equipment Needed After Discharge none    Current Discharge Risk substance use/abuse               Discharge Plan     Row Name 03/30/23 1108       Plan    Plan Initial    Plan Comments CM spoke with patient at bedside regarding DC planning. Patient resides in [facility], alone. Patient is independent with ADL’s, denies any DME. Patient denies any current home health or outpatient services. Patient has medical insurance, prescription coverage and is able to afford/obtain medications without difficulty. Patient  denies any discharge planning needs. Plan is home. CM will continue to follow.    Final Discharge Disposition Code 30 - still a patient              Continued Care and Services - Admitted Since 3/30/2023    Coordination has not been started for this encounter.       Expected Discharge Date and Time     Expected Discharge Date Expected Discharge Time    Apr 1, 2023          Demographic Summary     Row Name 03/30/23 1105       General Information    Arrived From home    Referral Source emergency department    Reason for Consult discharge planning    Preferred Language English       Contact Information    Contact Information Comments eloise august [phone]               Functional Status     Row Name 03/30/23 1106       Functional Status    Usual Activity Tolerance good    Current Activity Tolerance good       Physical Activity    On average, how many days per week do you engage in moderate to strenuous exercise (like a brisk walk)? 0 days    On average, how many minutes do you engage in exercise at this level? 0 min    Number of minutes of exercise per week 0       Assessment of Health Literacy    How often do you have someone help you read hospital materials? Never    How often do you have problems learning about your medical condition because of difficulty understanding written information? Never    How often do you have a problem understanding what is told to you about your medical condition? Never    How confident are you filling out medical forms by yourself? Quite a bit    Health Literacy Good       Functional Status, IADL    Medications independent    Meal Preparation independent    Housekeeping independent    Laundry independent    Shopping independent       Mental Status    General Appearance WDL WDL       Mental Status Summary    Recent Changes in Mental Status/Cognitive Functioning no changes       Employment/    Employment Status employed full-time               Psychosocial    No  documentation.                Abuse/Neglect    No documentation.                Legal    No documentation.                Substance Abuse    No documentation.                Patient Forms    No documentation.                   Nancy Solares RN

## 2023-03-30 NOTE — Clinical Note
Level of Care: Telemetry [5]   Diagnosis: Alcohol withdrawal (HCC) [291.81.ICD-9-CM]   Admitting Physician: MATT DEL RIO [[id number]]   Attending Physician: MATT DEL RIO [[id number]]   Certification: I Certify That Inpatient Hospital Services Are Medically Necessary For Greater Than 2 Midnights

## 2023-03-30 NOTE — CONSULTS
[facility]ology Consult    Referring Provider: No ref. provider found    PCP: Claudette Ho MD    Reason for Consultation: pancreatitis    Chief complaint: abdominal pain    History of present illness:    Robson Miller is a 32 y.o. male overall in good health who is admitted with severe acute pancreatitis.  He has a history of excessive alcohol use since the pandemic, he started drinking 10-12 liquor drinks daily usually bourbon or gin.  He reports he has been having abdominal pain on and off for the past few months which became more severe yesterday and more constant.  He has had some nausea and vomiting.    He complains of abdominal pain, does not want to receive any narcotics.  No prior history of withdrawal.  He is agreeable to Valium and Ativan while hospitalized.  His last drink was this morning.    Allergies:  Ceclor [cefaclor], Doxycycline, Penicillins, and Sulfa antibiotics    Scheduled Meds:  cloNIDine, 0.1 mg, Oral, Q6H  diazePAM, 10 mg, Oral, Q6H  enoxaparin, 40 mg, Subcutaneous, Nightly  senna-docusate sodium, 2 tablet, Oral, BID  sodium chloride, 10 mL, Intravenous, Q12H         Infusions:  lactated ringers, 300 mL/hr   Followed by  [START ON 3/31/2023] lactated ringers, 150 mL/hr        PRN Meds:  •  acetaminophen **OR** acetaminophen **OR** acetaminophen  •  senna-docusate sodium **AND** polyethylene glycol **AND** bisacodyl **AND** bisacodyl  •  ketorolac  •  LORazepam **OR** LORazepam **OR** LORazepam **OR** LORazepam **OR** LORazepam **OR** LORazepam  •  ondansetron **OR** ondansetron  •  prochlorperazine  •  Sodium Chloride (PF)  •  sodium chloride  •  sodium chloride    Home Meds:  No medications prior to admission.       ROS: Review of Systems   Constitutional: Positive for appetite change and fatigue.   HENT: Negative for trouble swallowing.    Eyes: Negative.    Respiratory: Negative for cough and shortness of breath.    Cardiovascular: Negative for chest pain and leg swelling.  "  Gastrointestinal: Positive for abdominal pain, constipation, nausea and vomiting. Negative for abdominal distention.   Genitourinary: Negative for difficulty urinating.   Musculoskeletal: Negative for arthralgias and myalgias.   Neurological: Negative for weakness and headaches.   Psychiatric/Behavioral: Negative for agitation and confusion.       PAST MED HX:  Past Medical History:   Diagnosis Date   • Alcohol abuse        PAST SURG HX:  Past Surgical History:   Procedure Laterality Date   • WRIST FRACTURE SURGERY         FAM HX:  Family History   Problem Relation Age of Onset   • Other Mother    • Transient ischemic attack Father        SOC HX:  Social History     Socioeconomic History   • Marital status: Single   Tobacco Use   • Smoking status: Every Day     Packs/day: 0.25     Years: 15.00     Pack years: 3.75     Types: Cigarettes   Vaping Use   • Vaping Use: Never used   Substance and Sexual Activity   • Alcohol use: Yes     Comment: HALF A FIFTH OR MORE DAILY FOR THE PAST COUPLE MONTHS.    • Drug use: Yes     Types: Marijuana     Comment: DAILY       PHYSICAL EXAM  BP (!) 145/112 (BP Location: Right arm, Patient Position: Lying)   Pulse 110   Temp 98 °F (36.7 °C) (Oral)   Resp 18   Ht 172.7 cm (68\")   Wt 74.8 kg (165 lb)   SpO2 94%   BMI 25.09 kg/m²   Wt Readings from Last 3 Encounters:   03/30/23 74.8 kg (165 lb)   11/20/20 81.6 kg (180 lb)   ,body mass index is 25.09 kg/m².  Physical Exam  Vitals and nursing note reviewed.   Constitutional:       Appearance: Normal appearance. He is well-developed.   HENT:      Head: Normocephalic and atraumatic.      Nose: Nose normal.      Mouth/Throat:      Mouth: Mucous membranes are moist.      Pharynx: Oropharynx is clear.   Eyes:      Pupils: Pupils are equal, round, and reactive to light.   Cardiovascular:      Rate and Rhythm: Regular rhythm. Tachycardia present.   Pulmonary:      Effort: Pulmonary effort is normal.      Breath sounds: Normal breath " sounds. No wheezing or rales.   Abdominal:      General: Bowel sounds are normal.      Palpations: Abdomen is soft. There is no mass.      Tenderness: There is abdominal tenderness in the epigastric area and left upper quadrant. There is no guarding or rebound.      Hernia: No hernia is present.   Musculoskeletal:         General: Normal range of motion.      Cervical back: Normal range of motion and neck supple.   Skin:     General: Skin is warm and dry.   Neurological:      Mental Status: He is alert and oriented to person, place, and time.      Cranial Nerves: No cranial nerve deficit.   Psychiatric:         Behavior: Behavior normal.         Judgment: Judgment normal.         Results Review:   I reviewed the patient's new clinical results.    Lab Results   Component Value Date    WBC 15.53 (H) 03/30/2023    HGB 17.8 (H) 03/30/2023    HGB 16.2 10/06/2022    HGB 17.6 11/20/2020    HCT 51.0 03/30/2023    MCV 95.9 03/30/2023     03/30/2023       No results found for: INR    Lab Results   Component Value Date    GLUCOSE 145 (H) 03/30/2023    BUN 6 03/30/2023    CREATININE 0.86 03/30/2023    EGFRIFNONA 97 11/20/2020    EGFRIFAFRI 118 11/20/2020    BCR 7.0 03/30/2023     (L) 03/30/2023    K 3.7 03/30/2023    CO2 19.0 (L) 03/30/2023    CALCIUM 10.4 03/30/2023    ALBUMIN 4.7 03/30/2023    ALKPHOS 123 (H) 03/30/2023    BILITOT 1.5 (H) 03/30/2023    ALT 20 03/30/2023    AST 37 03/30/2023   CT ABDOMEN PELVIS WO CONTRAST     Date of Exam: 3/30/2023 8:54 AM EDT     Indication: Epigastric pain     Comparison: None available.     Technique: Axial CT images were obtained of the abdomen and pelvis without the administration of contrast. Reconstructed coronal and sagittal images were also obtained. Automated exposure control and iterative construction methods were used.     Findings:  The lung bases are clear. Evaluation of the body wall soft tissues demonstrates no acute or suspicious findings. Evaluation of the  osseous structures demonstrates no evidence of acute fracture or aggressive osseous lesion. The liver demonstrates   homogeneous low attenuation, consistent with steatosis. There is no biliary ductal dilatation or suspicious focal parenchymal lesion on limited noncontrast exam. The gallbladder appears unremarkable. The spleen is homogeneous. There is severe extensive   pancreatic parenchymal and peripancreatic edema consistent with acute pancreatitis. Evaluation for necrosis is somewhat limited on noncontrast exam. Some ill-defined adjacent fluid is present along the uncinate and there is also free fluid layering in   the pelvis. There is otherwise no definite focal pancreatic fluid collection concerning for pseudocyst or acute necrotic collection. The kidneys are normal. Small and large bowel loops are nondilated, without evidence of obstruction. There is some mild   wall edema present involving segments of small bowel and transverse colon with air-fluid levels present, suggesting likely secondary enteritis/colitis. There is no overt pneumoperitoneum. The pelvic viscera demonstrate no additional acute findings.   Nonaneurysmal abdominal aorta. Scattered likely reactive mediastinal and small bowel mesenteric lymph nodes are present.     IMPRESSION:  Impression:  Findings of severe acute pancreatitis with diffuse pancreatic parenchymal edema, peripancreatic edema, adjacent free fluid and mesenteric edema as well as layering fluid in the pelvis and free retroperitoneal fluid along the right paracolic gutter. There   is no evidence of focal pancreatic fluid collection. Evaluation for necrosis is limited on noncontrast exam.     Some mild colonic and small bowel wall edema is present, suggesting concurrent, likely secondary enteritis/colitis.     Diffuse hepatic steatosis        Electronically Signed: Se Bowie    3/30/2023 9:22 AM EDT     ASSESSMENTS/PLANS  1.  Severe acute pancreatitis secondary to alcohol use  2.   EtOH abuse  - Discontinue normal saline  - Begin Lactated Ringer's at 300 mL an hour for 4 L then decrease to 150 mL an hour  - CIWA protocol per attending  - Pain control per attending    I discussed the patient's findings and my recommendations with patient    Arya Riojas, ANAID  03/30/23  15:04 EDT

## 2023-03-30 NOTE — ED PROVIDER NOTES
Subjective   History of Present Illness  32-year-old male presents emergency department today with epigastric pain.  He reports his epigastric pain has been going on for the past 2 to 3 days and seems to be getting worse.  He reports that he drinks alcohol prior about 6-8 drinks a day.  He has had pain similar to this in the past but never been diagnosed with pancreatitis.  He is requesting we give him no current narcotics due to a previous narcotics addiction problem.  He states he has had no blood in his stool no black tarry stool.  He has not had any vomiting of blood.  The epigastric pain seems to be made worse with any type of eating or drinking.  He states that he has had no shortness of breath no cough no wheezing.  No previous abdominal surgeries    History provided by:  Patient   used: No    Abdominal Pain  Pain location:  Epigastric  Pain quality: dull and gnawing    Pain radiates to:  Back  Pain severity:  Severe  Onset quality:  Gradual  Timing:  Constant  Progression:  Worsening  Chronicity:  Recurrent  Context: alcohol use and eating    Context: not diet changes, not medication withdrawal, not previous surgeries, not recent illness, not recent travel, not sick contacts and not trauma    Relieved by:  Nothing  Worsened by:  Eating (Drinking)  Associated symptoms: anorexia, nausea and vomiting    Associated symptoms: no chest pain, no chills, no constipation, no cough, no diarrhea, no dysuria, no fever, no hematemesis, no hematochezia and no hematuria    Risk factors: alcohol abuse    Risk factors: has not had multiple surgeries, no NSAID use, not obese and no recent hospitalization        Review of Systems   Constitutional: Negative for chills and fever.   Respiratory: Negative for cough, chest tightness and wheezing.    Cardiovascular: Negative for chest pain and palpitations.   Gastrointestinal: Positive for abdominal pain, anorexia, nausea and vomiting. Negative for constipation,  "diarrhea, hematemesis and hematochezia.   Genitourinary: Negative for dysuria, frequency, hematuria and urgency.   Musculoskeletal: Positive for back pain. Negative for neck pain.   Skin: Negative for pallor and rash.   Psychiatric/Behavioral: Negative.    All other systems reviewed and are negative.      Past Medical History:   Diagnosis Date   • Alcohol abuse        Allergies   Allergen Reactions   • Ceclor [Cefaclor] Other (See Comments)     \" I DONT KNOW I WAS AN INFANT WHEN I GOT IT\"    • Doxycycline Other (See Comments)     \"MAKES ME FEEL VERY ILL\"   • Penicillins Other (See Comments)     \"I DO NOT KNOW I WAS GIVEN THIS AS A CHILD\"    • Sulfa Antibiotics Other (See Comments)     \" I DONT KNOW I WAS AN INFANT WHEN I WAS GIVEN IT\"        Past Surgical History:   Procedure Laterality Date   • WRIST FRACTURE SURGERY         Family History   Problem Relation Age of Onset   • Other Mother    • Transient ischemic attack Father        Social History     Socioeconomic History   • Marital status: Single   Tobacco Use   • Smoking status: Every Day     Packs/day: 0.25     Years: 15.00     Pack years: 3.75     Types: Cigarettes   Vaping Use   • Vaping Use: Never used   Substance and Sexual Activity   • Alcohol use: Yes     Comment: HALF A FIFTH OR MORE DAILY FOR THE PAST COUPLE MONTHS.    • Drug use: Yes     Types: Marijuana     Comment: DAILY           Objective   Physical Exam  Vitals and nursing note reviewed.   Constitutional:       Appearance: He is well-developed.      Comments: Appears very uncomfortable holding his epigastrium.  Mildly pale   HENT:      Head: Normocephalic and atraumatic.      Right Ear: External ear normal.      Left Ear: External ear normal.      Nose: Nose normal.   Eyes:      General: No scleral icterus.     Conjunctiva/sclera: Conjunctivae normal.      Pupils: Pupils are equal, round, and reactive to light.   Neck:      Thyroid: No thyromegaly.   Cardiovascular:      Rate and Rhythm: Regular " rhythm. Tachycardia present.      Heart sounds: Normal heart sounds.   Pulmonary:      Effort: Pulmonary effort is normal. No respiratory distress.      Breath sounds: Normal breath sounds. No wheezing or rales.   Chest:      Chest wall: No tenderness.   Abdominal:      General: Bowel sounds are normal. There is no distension.      Palpations: Abdomen is soft.      Tenderness: There is abdominal tenderness in the right upper quadrant, epigastric area and left upper quadrant. There is guarding. There is no right CVA tenderness, left CVA tenderness or rebound. Negative signs include Matt's sign, Rovsing's sign, McBurney's sign, psoas sign and obturator sign.      Hernia: No hernia is present.   Musculoskeletal:         General: Normal range of motion.      Cervical back: Normal range of motion.   Lymphadenopathy:      Cervical: No cervical adenopathy.   Skin:     General: Skin is warm and dry.   Neurological:      Mental Status: He is alert and oriented to person, place, and time.      Cranial Nerves: No cranial nerve deficit.      Coordination: Coordination normal.      Deep Tendon Reflexes: Reflexes are normal and symmetric. Reflexes normal.   Psychiatric:         Behavior: Behavior normal.         Thought Content: Thought content normal.         Judgment: Judgment normal.         Procedures           ED Course  ED Course as of 03/31/23 1339   Thu Mar 30, 2023   1000 Lactate(!!): 4.4 [MEGAN]   1000 Lipase(!): 656 [MEGAN]   1000 WBC(!): 15.53 [MEGAN]   1000 Sodium(!): 131 [MEGAN]      ED Course User Index  [MEGAN] Enmanuel Rangel PA                                 Recent Results (from the past 24 hour(s))   Comprehensive Metabolic Panel    Collection Time: 03/31/23  3:48 AM    Specimen: Blood   Result Value Ref Range    Glucose 84 65 - 99 mg/dL    BUN 5 (L) 6 - 20 mg/dL    Creatinine 0.73 (L) 0.76 - 1.27 mg/dL    Sodium 136 136 - 145 mmol/L    Potassium 3.7 3.5 - 5.2 mmol/L    Chloride 99 98 - 107 mmol/L    CO2 25.0 22.0 -  29.0 mmol/L    Calcium 8.1 (L) 8.6 - 10.5 mg/dL    Total Protein 5.1 (L) 6.0 - 8.5 g/dL    Albumin 3.2 (L) 3.5 - 5.2 g/dL    ALT (SGPT) 10 1 - 41 U/L    AST (SGOT) 17 1 - 40 U/L    Alkaline Phosphatase 82 39 - 117 U/L    Total Bilirubin 1.0 0.0 - 1.2 mg/dL    Globulin 1.9 gm/dL    A/G Ratio 1.7 g/dL    BUN/Creatinine Ratio 6.8 (L) 7.0 - 25.0    Anion Gap 12.0 5.0 - 15.0 mmol/L    eGFR 124.0 >60.0 mL/min/1.73   Lipase    Collection Time: 03/31/23  3:48 AM    Specimen: Blood   Result Value Ref Range    Lipase 841 (H) 13 - 60 U/L   Lipid Panel    Collection Time: 03/31/23  3:48 AM    Specimen: Blood   Result Value Ref Range    Total Cholesterol 150 0 - 200 mg/dL    Triglycerides 103 0 - 150 mg/dL    HDL Cholesterol 58 40 - 60 mg/dL    LDL Cholesterol  73 0 - 100 mg/dL    VLDL Cholesterol 19 5 - 40 mg/dL    LDL/HDL Ratio 1.23    Magnesium    Collection Time: 03/31/23  3:48 AM    Specimen: Blood   Result Value Ref Range    Magnesium 1.3 (L) 1.6 - 2.6 mg/dL   Phosphorus    Collection Time: 03/31/23  3:48 AM    Specimen: Blood   Result Value Ref Range    Phosphorus 2.1 (L) 2.5 - 4.5 mg/dL   CBC Auto Differential    Collection Time: 03/31/23  9:30 AM    Specimen: Blood   Result Value Ref Range    WBC 10.08 3.40 - 10.80 10*3/mm3    RBC 4.03 (L) 4.14 - 5.80 10*6/mm3    Hemoglobin 13.7 13.0 - 17.7 g/dL    Hematocrit 38.8 37.5 - 51.0 %    MCV 96.3 79.0 - 97.0 fL    MCH 34.0 (H) 26.6 - 33.0 pg    MCHC 35.3 31.5 - 35.7 g/dL    RDW 13.8 12.3 - 15.4 %    RDW-SD 48.6 37.0 - 54.0 fl    MPV 8.5 6.0 - 12.0 fL    Platelets 185 140 - 450 10*3/mm3    Neutrophil % 81.1 (H) 42.7 - 76.0 %    Lymphocyte % 9.6 (L) 19.6 - 45.3 %    Monocyte % 6.6 5.0 - 12.0 %    Eosinophil % 1.9 0.3 - 6.2 %    Basophil % 0.5 0.0 - 1.5 %    Immature Grans % 0.3 0.0 - 0.5 %    Neutrophils, Absolute 8.17 (H) 1.70 - 7.00 10*3/mm3    Lymphocytes, Absolute 0.97 0.70 - 3.10 10*3/mm3    Monocytes, Absolute 0.67 0.10 - 0.90 10*3/mm3    Eosinophils, Absolute 0.19 0.00 -  0.40 10*3/mm3    Basophils, Absolute 0.05 0.00 - 0.20 10*3/mm3    Immature Grans, Absolute 0.03 0.00 - 0.05 10*3/mm3    nRBC 0.0 0.0 - 0.2 /100 WBC     Note: In addition to lab results from this visit, the labs listed above may include labs taken at another facility or during a different encounter within the last 24 hours. Please correlate lab times with ED admission and discharge times for further clarification of the services performed during this visit.    CT Abdomen Pelvis Without Contrast   Final Result   Impression:   Findings of severe acute pancreatitis with diffuse pancreatic parenchymal edema, peripancreatic edema, adjacent free fluid and mesenteric edema as well as layering fluid in the pelvis and free retroperitoneal fluid along the right paracolic gutter. There    is no evidence of focal pancreatic fluid collection. Evaluation for necrosis is limited on noncontrast exam.      Some mild colonic and small bowel wall edema is present, suggesting concurrent, likely secondary enteritis/colitis.      Diffuse hepatic steatosis         Electronically Signed: Se Bowie     3/30/2023 9:22 AM EDT     Workstation ID: [id number]        Vitals:    03/31/23 0451 03/31/23 0500 03/31/23 0723 03/31/23 1106   BP: 123/84  119/90 (!) 143/108   BP Location: Left arm  Left arm Left arm   Patient Position: Lying  Lying Sitting   Pulse: 113 99  (!) 125   Resp: 18  18 18   Temp: 98.9 °F (37.2 °C)  98.8 °F (37.1 °C) 98.4 °F (36.9 °C)   TempSrc: Oral  Axillary Axillary   SpO2: 95% 92%     Weight:       Height:         Medications   Sodium Chloride (PF) 0.9 % 10 mL (has no administration in time range)   prochlorperazine (COMPAZINE) injection 5 mg (5 mg Intravenous Given 3/30/23 0837)   sodium chloride 0.9 % flush 10 mL (10 mL Intravenous Given 3/31/23 0835)   sodium chloride 0.9 % flush 10 mL (has no administration in time range)   sodium chloride 0.9 % infusion 40 mL (has no administration in time range)   Enoxaparin Sodium  (LOVENOX) syringe 40 mg (40 mg Subcutaneous Given 3/30/23 2055)   acetaminophen (TYLENOL) tablet 650 mg (has no administration in time range)     Or   acetaminophen (TYLENOL) 160 MG/5ML solution 650 mg (has no administration in time range)     Or   acetaminophen (TYLENOL) suppository 650 mg (has no administration in time range)   ondansetron (ZOFRAN) tablet 4 mg ( Oral Not Given:  See Alt 3/31/23 1331)     Or   ondansetron (ZOFRAN) injection 4 mg (4 mg Intravenous Given 3/31/23 1331)   LORazepam (ATIVAN) tablet 1 mg (has no administration in time range)     Or   LORazepam (ATIVAN) injection 1 mg (has no administration in time range)     Or   LORazepam (ATIVAN) tablet 2 mg (has no administration in time range)     Or   LORazepam (ATIVAN) injection 2 mg (has no administration in time range)     Or   LORazepam (ATIVAN) injection 2 mg (has no administration in time range)     Or   LORazepam (ATIVAN) injection 2 mg (has no administration in time range)   cloNIDine (CATAPRES) tablet 0.1 mg (0.1 mg Oral Given 3/31/23 1111)   diazePAM (VALIUM) tablet 10 mg (10 mg Oral Given 3/31/23 1111)   ketorolac (TORADOL) injection 30 mg (30 mg Intravenous Given 3/30/23 2358)   sennosides-docusate (PERICOLACE) 8.6-50 MG per tablet 2 tablet (2 tablets Oral Not Given 3/31/23 0837)     And   polyethylene glycol (MIRALAX) packet 17 g (has no administration in time range)     And   bisacodyl (DULCOLAX) EC tablet 5 mg (has no administration in time range)     And   bisacodyl (DULCOLAX) suppository 10 mg (has no administration in time range)   lactated ringers infusion (300 mL/hr Intravenous New Bag 3/31/23 0055)   HYDROmorphone (DILAUDID) injection 0.5 mg (0.5 mg Intravenous Given 3/31/23 1331)   Potassium Replacement - Follow Nurse / BPA Driven Protocol (has no administration in time range)   Phosphorus Replacement - Follow Nurse / BPA Driven Protocol (has no administration in time range)   Calcium Replacement - Follow Nurse / BPA Driven  Protocol (has no administration in time range)   Magnesium Standard Dose Replacement - Follow Nurse / BPA Driven Protocol (has no administration in time range)   magnesium sulfate 2g/50 mL (PREMIX) infusion (2 g Intravenous New Bag 3/31/23 1317)   lactated ringers infusion (has no administration in time range)   ketorolac (TORADOL) injection 15 mg (15 mg Intravenous Given 3/30/23 0837)   sodium chloride 0.9 % bolus 1,000 mL (0 mL Intravenous Stopped 3/30/23 1055)   famotidine (PEPCID) injection 20 mg (20 mg Intravenous Given 3/30/23 0902)   cloNIDine (CATAPRES) tablet 0.1 mg (0.1 mg Oral Given 3/30/23 1121)   thiamine (B-1) injection 100 mg ( Intravenous Not Given:  See Alt 3/30/23 1445)     Or   thiamine (VITAMIN B-1) tablet 100 mg (100 mg Oral Given 3/30/23 1445)   ketorolac (TORADOL) injection 15 mg (15 mg Intravenous Given 3/30/23 1150)     ECG/EMG Results (last 24 hours)     ** No results found for the last 24 hours. **        SCANNED - TELEMETRY     Final Result      SCANNED - TELEMETRY     Final Result                    Medical Decision Making  32-year-old presents appearing very uncomfortable holding his epigastrium.  He states that he drinks about 6-8 drinks of alcohol a day.  He has had similar pain but never been diagnosed with pancreatitis or  peptic ulcer disease.  Denies melena medic easier hematemesis.  Not currently on a PPI lipase elevated at 600 CT of the abdomen pelvis reveals an acute pancreatitis.  I discussed the findings with Mr. Miller and his need for admission he verbalizes understanding.  He stands up he would like to avoid narcotics of possible due to previous narcotic abuse problem.  Differential diagnosis #1 pancreatitis #2 GERD #3 cholecystitis #4 choledocholithiasis #5 peptic ulcer disease    Discussed with the hospitalist she will admit.    Alcohol withdrawal syndrome with complication (HCC): acute illness or injury  Alcohol-induced acute pancreatitis, unspecified complication  status: acute illness or injury  Hyponatremia: acute illness or injury  Amount and/or Complexity of Data Reviewed  Labs: ordered. Decision-making details documented in ED Course.  Radiology: ordered and independent interpretation performed. Decision-making details documented in ED Course.      Risk  Prescription drug management.  Decision regarding hospitalization.          Final diagnoses:   Alcohol-induced acute pancreatitis, unspecified complication status   Alcohol withdrawal syndrome with complication (HCC)   Hyponatremia       ED Disposition  ED Disposition     ED Disposition   Decision to Admit    Condition   --    Comment   Level of Care: Telemetry [5]  Diagnosis: Alcohol withdrawal (HCC) [291.81.ICD-9-CM]  Admitting Physician: MATT DEL RIO [[id number]]  Attending Physician: MATT DEL RIO [[id number]]  Certification: I Certify That Inpatient Hospital Services Are Med ically Necessary For Greater Than 2 Midnights               No follow-up provider specified.       Medication List      No changes were made to your prescriptions during this visit.          Enmanuel Rangel PA  03/31/23 3036

## 2023-03-30 NOTE — PLAN OF CARE
Goal Outcome Evaluation:  Pt arrived from ER, fluids initiated, cld, pain control, st on tele.

## 2023-03-30 NOTE — H&P
[facility] Medicine Services  HISTORY AND PHYSICAL    Patient Name: Rosbon Miller  : 1990  MRN: [id number]  Primary Care Physician: System, Provider Not In  Date of admission: 3/30/2023      Subjective   Subjective     Chief Complaint:  Abdominal pain     HPI:  Robson Miller is a 32 y.o. male with PMHx Alcohol use since the pandemic started, drinks 10-12 [place] or Gin drinks daily. Has not had much success in trying to cut back. He is not interested in any type of rehab. He came to ED today due to worsening abdominal pain. States it has been off and on for the past few months but became more severe yesterday and decided to come to ED for evaluation. He has never been diagnosed with pancreatitis before.   In ED, CT A/P with severe acute pancreatitis. Patient does not want to receive any narcotics. We discussed ETOH withdrawal. No prior Hx of withdrawal but also has not gone a significant time without a drink. He is agreeable to Valium and Ativan. Discussed he is high risk for withdrawal and may eventually require ICU level care. Hospital medicine to admit.     Review of Systems   Constitutional: Negative for activity change, chills, fatigue, fever and unexpected weight change.   HENT: Negative for congestion and sore throat.    Eyes: Negative for visual disturbance.   Respiratory: Negative for cough, shortness of breath and wheezing.    Cardiovascular: Negative for chest pain, palpitations and leg swelling.   Gastrointestinal: Positive for abdominal pain, constipation, nausea and vomiting. Negative for diarrhea.   Genitourinary: Negative for dysuria and urgency.   Musculoskeletal: Negative for back pain.   Skin: Negative for pallor and rash.   Neurological: Negative for dizziness and weakness.   Psychiatric/Behavioral: Negative for confusion.        Personal History     Past Medical History:   Diagnosis Date   • Alcohol abuse              Past Surgical History:   Procedure  "Laterality Date   • WRIST FRACTURE SURGERY         Family History: family history includes Other in his mother; Transient ischemic attack in his father.     Social History:  reports that he has been smoking cigarettes. He has been smoking an average of .5 packs per day. He does not have any smokeless tobacco history on file. He reports current alcohol use. He reports current drug use. Drug: Marijuana.  Social History     Social History Narrative   • Not on file       Medications:  Available home medication information reviewed.  (Not in a hospital admission)      Allergies   Allergen Reactions   • Ceclor [Cefaclor] Other (See Comments)     \" I DONT KNOW I WAS AN INFANT WHEN I GOT IT\"    • Doxycycline Other (See Comments)     \"MAKES ME FEEL VERY ILL\"   • Penicillins Other (See Comments)     \"I DO NOT KNOW I WAS GIVEN THIS AS A CHILD\"    • Sulfa Antibiotics Other (See Comments)     \" I DONT KNOW I WAS AN INFANT WHEN I WAS GIVEN IT\"        Objective   Objective     Vital Signs:   Temp:  [97.8 °F (36.6 °C)] 97.8 °F (36.6 °C)  Heart Rate:  [104-145] 116  Resp:  [18] 18  BP: (148-162)/(102-130) 156/112       Physical Exam   Constitutional: Awake, alert, NAD, non-toxic   Eyes: PERRLA, sclerae anicteric, no conjunctival injection  HENT: NCAT, mucous membranes moist  Neck: Supple, no thyromegaly, no lymphadenopathy, trachea midline  Respiratory: Clear to auscultation bilaterally, nonlabored respirations   Cardiovascular: Sinus tachycardia, no murmurs, rubs, or gallops, palpable pedal pulses bilaterally  Gastrointestinal: Positive bowel sounds, soft, nontender, nondistended  Musculoskeletal: No bilateral ankle edema, no clubbing or cyanosis to extremities  Psychiatric: Appropriate affect but anxious, cooperative  Neurologic: Oriented x 3, strength symmetric in all extremities, Cranial Nerves grossly intact to confrontation, speech clear  Skin: No rashes    Result Review:  I have personally reviewed the results from the time " of this admission to 3/30/2023 10:44 EDT and agree with these findings:  [x]  Laboratory list / accordion  []  Microbiology  [x]  Radiology  []  EKG/Telemetry   []  Cardiology/Vascular   []  Pathology  []  Old records  []  Other:    LAB RESULTS:      Lab 03/30/23  0829   WBC 15.53*   HEMOGLOBIN 17.8*   HEMATOCRIT 51.0   PLATELETS 317   NEUTROS ABS 13.31*   IMMATURE GRANS (ABS) 0.12*   LYMPHS ABS 0.95   MONOS ABS 1.08*   EOS ABS 0.01   MCV 95.9   LACTATE 4.4*         Lab 03/30/23  0829   SODIUM 131*   POTASSIUM 3.7   CHLORIDE 90*   CO2 19.0*   ANION GAP 22.0*   BUN 6   CREATININE 0.86   EGFR 118.0   GLUCOSE 145*   CALCIUM 10.4         Lab 03/30/23  0829   TOTAL PROTEIN 7.4   ALBUMIN 4.7   GLOBULIN 2.7   ALT (SGPT) 20   AST (SGOT) 37   BILIRUBIN 1.5*   ALK PHOS 123*   LIPASE 656*                     UA    Urinalysis 3/30/23 3/30/23    0910 0910   Squamous Epithelial Cells, UA  3-6 (A)   Specific Gravity, UA 1.039 (A)    Ketones, UA 80 mg/dL (3+) (A)    Blood, UA Trace (A)    Leukocytes, UA Negative    Nitrite, UA Positive (A)    RBC, UA  0-2   WBC, UA  0-2   Bacteria, UA  None Seen   (A) Abnormal value              Microbiology Results (last 10 days)     ** No results found for the last 240 hours. **          CT Abdomen Pelvis Without Contrast    Result Date: 3/30/2023  CT ABDOMEN PELVIS WO CONTRAST Date of Exam: 3/30/2023 8:54 AM EDT Indication: Epigastric pain Comparison: None available. Technique: Axial CT images were obtained of the abdomen and pelvis without the administration of contrast. Reconstructed coronal and sagittal images were also obtained. Automated exposure control and iterative construction methods were used. Findings: The lung bases are clear. Evaluation of the body wall soft tissues demonstrates no acute or suspicious findings. Evaluation of the osseous structures demonstrates no evidence of acute fracture or aggressive osseous lesion. The liver demonstrates homogeneous low attenuation, consistent  with steatosis. There is no biliary ductal dilatation or suspicious focal parenchymal lesion on limited noncontrast exam. The gallbladder appears unremarkable. The spleen is homogeneous. There is severe extensive pancreatic parenchymal and peripancreatic edema consistent with acute pancreatitis. Evaluation for necrosis is somewhat limited on noncontrast exam. Some ill-defined adjacent fluid is present along the uncinate and there is also free fluid layering in the pelvis. There is otherwise no definite focal pancreatic fluid collection concerning for pseudocyst or acute necrotic collection. The kidneys are normal. Small and large bowel loops are nondilated, without evidence of obstruction. There is some mild wall edema present involving segments of small bowel and transverse colon with air-fluid levels present, suggesting likely secondary enteritis/colitis. There is no overt pneumoperitoneum. The pelvic viscera demonstrate no additional acute findings. Nonaneurysmal abdominal aorta. Scattered likely reactive mediastinal and small bowel mesenteric lymph nodes are present.     Impression: Impression: Findings of severe acute pancreatitis with diffuse pancreatic parenchymal edema, peripancreatic edema, adjacent free fluid and mesenteric edema as well as layering fluid in the pelvis and free retroperitoneal fluid along the right paracolic gutter. There  is no evidence of focal pancreatic fluid collection. Evaluation for necrosis is limited on noncontrast exam. Some mild colonic and small bowel wall edema is present, suggesting concurrent, likely secondary enteritis/colitis. Diffuse hepatic steatosis Electronically Signed: Se Bowie  3/30/2023 9:22 AM EDT  Workstation ID: [id number]          Assessment & Plan   Assessment & Plan     Active Hospital Problems    Diagnosis  POA   • **Alcohol withdrawal (HCC) [F10.939]  Yes   • Lactic acidosis [E87.20]  Unknown   • Severe acute pancreatitis [K85.90]  Unknown     Alcohol  Abuse   Severe acute pancreatitis secondary to alcohol use   Lactic Acidosis, clinically significant   Alcoholic Ketoacidosis   -Aggressive IVF. Received 1000mL bolus in ED. Continue NS 250cc/hr  -CIWA protocol with scheduled Valium.   -Thiamine, Folic Acid   -Clear liquid diet  -Pain control, patient would like to avoid narcotics   -Consult to GI   -Last drink this AM    Glucosuria   Proteinuria   -Check Hga1c     *Patient high risk for needing ICU/Precedex and also high risk of leaving AMA. He has a cat at home with no one to feed it.     DVT prophylaxis:  Lovenox    CODE STATUS:  Full Code   There are no questions and answers to display.       Expected Discharge   Expected Discharge Date and Time     Expected Discharge Date Expected Discharge Time    Apr 1, 2023            Poornima Weber,   03/30/23

## 2023-03-31 LAB
ALBUMIN SERPL-MCNC: 3.2 G/DL (ref 3.5–5.2)
ALBUMIN/GLOB SERPL: 1.7 G/DL
ALP SERPL-CCNC: 82 U/L (ref 39–117)
ALT SERPL W P-5'-P-CCNC: 10 U/L (ref 1–41)
ANION GAP SERPL CALCULATED.3IONS-SCNC: 12 MMOL/L (ref 5–15)
AST SERPL-CCNC: 17 U/L (ref 1–40)
BASOPHILS # BLD AUTO: 0.05 10*3/MM3 (ref 0–0.2)
BASOPHILS NFR BLD AUTO: 0.5 % (ref 0–1.5)
BILIRUB SERPL-MCNC: 1 MG/DL (ref 0–1.2)
BUN SERPL-MCNC: 5 MG/DL (ref 6–20)
BUN/CREAT SERPL: 6.8 (ref 7–25)
CALCIUM SPEC-SCNC: 8.1 MG/DL (ref 8.6–10.5)
CHLORIDE SERPL-SCNC: 99 MMOL/L (ref 98–107)
CHOLEST SERPL-MCNC: 150 MG/DL (ref 0–200)
CO2 SERPL-SCNC: 25 MMOL/L (ref 22–29)
CREAT SERPL-MCNC: 0.73 MG/DL (ref 0.76–1.27)
DEPRECATED RDW RBC AUTO: 48.6 FL (ref 37–54)
EGFRCR SERPLBLD CKD-EPI 2021: 124 ML/MIN/1.73
EOSINOPHIL # BLD AUTO: 0.19 10*3/MM3 (ref 0–0.4)
EOSINOPHIL NFR BLD AUTO: 1.9 % (ref 0.3–6.2)
ERYTHROCYTE [DISTWIDTH] IN BLOOD BY AUTOMATED COUNT: 13.8 % (ref 12.3–15.4)
GLOBULIN UR ELPH-MCNC: 1.9 GM/DL
GLUCOSE SERPL-MCNC: 84 MG/DL (ref 65–99)
HCT VFR BLD AUTO: 38.8 % (ref 37.5–51)
HDLC SERPL-MCNC: 58 MG/DL (ref 40–60)
HGB BLD-MCNC: 13.7 G/DL (ref 13–17.7)
IMM GRANULOCYTES # BLD AUTO: 0.03 10*3/MM3 (ref 0–0.05)
IMM GRANULOCYTES NFR BLD AUTO: 0.3 % (ref 0–0.5)
LDLC SERPL CALC-MCNC: 73 MG/DL (ref 0–100)
LDLC/HDLC SERPL: 1.23 {RATIO}
LIPASE SERPL-CCNC: 841 U/L (ref 13–60)
LYMPHOCYTES # BLD AUTO: 0.97 10*3/MM3 (ref 0.7–3.1)
LYMPHOCYTES NFR BLD AUTO: 9.6 % (ref 19.6–45.3)
MAGNESIUM SERPL-MCNC: 1.3 MG/DL (ref 1.6–2.6)
MCH RBC QN AUTO: 34 PG (ref 26.6–33)
MCHC RBC AUTO-ENTMCNC: 35.3 G/DL (ref 31.5–35.7)
MCV RBC AUTO: 96.3 FL (ref 79–97)
MONOCYTES # BLD AUTO: 0.67 10*3/MM3 (ref 0.1–0.9)
MONOCYTES NFR BLD AUTO: 6.6 % (ref 5–12)
NEUTROPHILS NFR BLD AUTO: 8.17 10*3/MM3 (ref 1.7–7)
NEUTROPHILS NFR BLD AUTO: 81.1 % (ref 42.7–76)
NRBC BLD AUTO-RTO: 0 /100 WBC (ref 0–0.2)
PHOSPHATE SERPL-MCNC: 2.1 MG/DL (ref 2.5–4.5)
PLATELET # BLD AUTO: 185 10*3/MM3 (ref 140–450)
PMV BLD AUTO: 8.5 FL (ref 6–12)
POTASSIUM SERPL-SCNC: 3.7 MMOL/L (ref 3.5–5.2)
PROT SERPL-MCNC: 5.1 G/DL (ref 6–8.5)
RBC # BLD AUTO: 4.03 10*6/MM3 (ref 4.14–5.8)
SODIUM SERPL-SCNC: 136 MMOL/L (ref 136–145)
TRIGL SERPL-MCNC: 103 MG/DL (ref 0–150)
VLDLC SERPL-MCNC: 19 MG/DL (ref 5–40)
WBC NRBC COR # BLD: 10.08 10*3/MM3 (ref 3.4–10.8)

## 2023-03-31 PROCEDURE — 25010000002 HYDROMORPHONE PER 4 MG: Performed by: FAMILY MEDICINE

## 2023-03-31 PROCEDURE — 80053 COMPREHEN METABOLIC PANEL: CPT | Performed by: FAMILY MEDICINE

## 2023-03-31 PROCEDURE — 25010000002 MAGNESIUM SULFATE 2 GM/50ML SOLUTION: Performed by: HOSPITALIST

## 2023-03-31 PROCEDURE — 83690 ASSAY OF LIPASE: CPT | Performed by: FAMILY MEDICINE

## 2023-03-31 PROCEDURE — 84100 ASSAY OF PHOSPHORUS: CPT | Performed by: FAMILY MEDICINE

## 2023-03-31 PROCEDURE — 25010000002 ENOXAPARIN PER 10 MG: Performed by: FAMILY MEDICINE

## 2023-03-31 PROCEDURE — 99233 SBSQ HOSP IP/OBS HIGH 50: CPT | Performed by: HOSPITALIST

## 2023-03-31 PROCEDURE — 99232 SBSQ HOSP IP/OBS MODERATE 35: CPT | Performed by: NURSE PRACTITIONER

## 2023-03-31 PROCEDURE — 83735 ASSAY OF MAGNESIUM: CPT | Performed by: FAMILY MEDICINE

## 2023-03-31 PROCEDURE — 25010000002 ONDANSETRON PER 1 MG: Performed by: FAMILY MEDICINE

## 2023-03-31 PROCEDURE — 80061 LIPID PANEL: CPT | Performed by: FAMILY MEDICINE

## 2023-03-31 PROCEDURE — 85025 COMPLETE CBC W/AUTO DIFF WBC: CPT | Performed by: FAMILY MEDICINE

## 2023-03-31 RX ORDER — SODIUM CHLORIDE, SODIUM LACTATE, POTASSIUM CHLORIDE, CALCIUM CHLORIDE 600; 310; 30; 20 MG/100ML; MG/100ML; MG/100ML; MG/100ML
150 INJECTION, SOLUTION INTRAVENOUS CONTINUOUS
Status: DISCONTINUED | OUTPATIENT
Start: 2023-03-31 | End: 2023-04-04

## 2023-03-31 RX ORDER — MAGNESIUM SULFATE HEPTAHYDRATE 40 MG/ML
2 INJECTION, SOLUTION INTRAVENOUS
Status: COMPLETED | OUTPATIENT
Start: 2023-03-31 | End: 2023-03-31

## 2023-03-31 RX ADMIN — CLONIDINE HYDROCHLORIDE 0.1 MG: 0.1 TABLET ORAL at 23:39

## 2023-03-31 RX ADMIN — HYDROMORPHONE HYDROCHLORIDE 0.5 MG: 1 INJECTION, SOLUTION INTRAMUSCULAR; INTRAVENOUS; SUBCUTANEOUS at 13:31

## 2023-03-31 RX ADMIN — ENOXAPARIN SODIUM 40 MG: 40 INJECTION SUBCUTANEOUS at 20:29

## 2023-03-31 RX ADMIN — SODIUM CHLORIDE, POTASSIUM CHLORIDE, SODIUM LACTATE AND CALCIUM CHLORIDE 150 ML/HR: 600; 310; 30; 20 INJECTION, SOLUTION INTRAVENOUS at 04:54

## 2023-03-31 RX ADMIN — DIAZEPAM 10 MG: 5 TABLET ORAL at 11:11

## 2023-03-31 RX ADMIN — Medication 10 ML: at 20:30

## 2023-03-31 RX ADMIN — SODIUM CHLORIDE, POTASSIUM CHLORIDE, SODIUM LACTATE AND CALCIUM CHLORIDE 250 ML/HR: 600; 310; 30; 20 INJECTION, SOLUTION INTRAVENOUS at 16:19

## 2023-03-31 RX ADMIN — DIAZEPAM 10 MG: 5 TABLET ORAL at 23:39

## 2023-03-31 RX ADMIN — HYDROMORPHONE HYDROCHLORIDE 0.5 MG: 1 INJECTION, SOLUTION INTRAMUSCULAR; INTRAVENOUS; SUBCUTANEOUS at 21:56

## 2023-03-31 RX ADMIN — SODIUM CHLORIDE, POTASSIUM CHLORIDE, SODIUM LACTATE AND CALCIUM CHLORIDE 150 ML/HR: 600; 310; 30; 20 INJECTION, SOLUTION INTRAVENOUS at 11:16

## 2023-03-31 RX ADMIN — SODIUM CHLORIDE, POTASSIUM CHLORIDE, SODIUM LACTATE AND CALCIUM CHLORIDE 300 ML/HR: 600; 310; 30; 20 INJECTION, SOLUTION INTRAVENOUS at 00:55

## 2023-03-31 RX ADMIN — HYDROMORPHONE HYDROCHLORIDE 0.5 MG: 1 INJECTION, SOLUTION INTRAMUSCULAR; INTRAVENOUS; SUBCUTANEOUS at 08:35

## 2023-03-31 RX ADMIN — SODIUM CHLORIDE, POTASSIUM CHLORIDE, SODIUM LACTATE AND CALCIUM CHLORIDE 250 ML/HR: 600; 310; 30; 20 INJECTION, SOLUTION INTRAVENOUS at 15:33

## 2023-03-31 RX ADMIN — CLONIDINE HYDROCHLORIDE 0.1 MG: 0.1 TABLET ORAL at 05:47

## 2023-03-31 RX ADMIN — HYDROMORPHONE HYDROCHLORIDE 0.5 MG: 1 INJECTION, SOLUTION INTRAMUSCULAR; INTRAVENOUS; SUBCUTANEOUS at 15:32

## 2023-03-31 RX ADMIN — MAGNESIUM SULFATE HEPTAHYDRATE 2 G: 2 INJECTION, SOLUTION INTRAVENOUS at 13:17

## 2023-03-31 RX ADMIN — HYDROMORPHONE HYDROCHLORIDE 0.5 MG: 1 INJECTION, SOLUTION INTRAMUSCULAR; INTRAVENOUS; SUBCUTANEOUS at 18:12

## 2023-03-31 RX ADMIN — DIAZEPAM 10 MG: 5 TABLET ORAL at 05:47

## 2023-03-31 RX ADMIN — ONDANSETRON 4 MG: 2 INJECTION INTRAMUSCULAR; INTRAVENOUS at 13:31

## 2023-03-31 RX ADMIN — HYDROMORPHONE HYDROCHLORIDE 0.5 MG: 1 INJECTION, SOLUTION INTRAMUSCULAR; INTRAVENOUS; SUBCUTANEOUS at 04:56

## 2023-03-31 RX ADMIN — CLONIDINE HYDROCHLORIDE 0.1 MG: 0.1 TABLET ORAL at 17:36

## 2023-03-31 RX ADMIN — MAGNESIUM SULFATE HEPTAHYDRATE 2 G: 2 INJECTION, SOLUTION INTRAVENOUS at 15:32

## 2023-03-31 RX ADMIN — HYDROMORPHONE HYDROCHLORIDE 0.5 MG: 1 INJECTION, SOLUTION INTRAMUSCULAR; INTRAVENOUS; SUBCUTANEOUS at 11:11

## 2023-03-31 RX ADMIN — CLONIDINE HYDROCHLORIDE 0.1 MG: 0.1 TABLET ORAL at 11:11

## 2023-03-31 RX ADMIN — Medication 10 ML: at 08:35

## 2023-03-31 RX ADMIN — DIAZEPAM 10 MG: 5 TABLET ORAL at 17:36

## 2023-03-31 RX ADMIN — SODIUM CHLORIDE, POTASSIUM CHLORIDE, SODIUM LACTATE AND CALCIUM CHLORIDE 250 ML/HR: 600; 310; 30; 20 INJECTION, SOLUTION INTRAVENOUS at 20:30

## 2023-03-31 RX ADMIN — MAGNESIUM SULFATE HEPTAHYDRATE 2 G: 2 INJECTION, SOLUTION INTRAVENOUS at 18:12

## 2023-03-31 NOTE — PROGRESS NOTES
"GI Daily Progress Note  Subjective:    Chief Complaint: Acute pancreatitis    Patient lying in bed, awake alert and oriented.  Reports his pain has improved somewhat however, this morning after eating a clear liquid diet for breakfast he had excruciating upper abdominal pain, worse than on his arrival to the hospital.  No fevers, he is tachycardic and hypertensive.    Objective:    BP (!) 143/108 (BP Location: Left arm, Patient Position: Sitting)   Pulse (!) 125   Temp 98.4 °F (36.9 °C) (Axillary)   Resp 18   Ht 172.7 cm (68\")   Wt 74.8 kg (165 lb)   SpO2 92%   BMI 25.09 kg/m²     Physical Exam  Constitutional:       Appearance: Normal appearance.   HENT:      Head: Normocephalic and atraumatic.   Pulmonary:      Effort: Pulmonary effort is normal.   Abdominal:      Palpations: Abdomen is soft.      Tenderness: There is abdominal tenderness in the right upper quadrant, epigastric area and left upper quadrant.      Comments: Tenderness greater in right upper quadrant   Neurological:      Mental Status: He is alert and oriented to person, place, and time.   Psychiatric:         Mood and Affect: Mood normal.         Thought Content: Thought content normal.         Lab  Lab Results   Component Value Date    WBC 10.08 03/31/2023    HGB 13.7 03/31/2023    HGB 17.8 (H) 03/30/2023    HGB 16.2 10/06/2022    MCV 96.3 03/31/2023     03/31/2023       Lab Results   Component Value Date    GLUCOSE 84 03/31/2023    BUN 5 (L) 03/31/2023    CREATININE 0.73 (L) 03/31/2023    EGFRIFNONA 97 11/20/2020    EGFRIFAFRI 118 11/20/2020    BCR 6.8 (L) 03/31/2023     03/31/2023    K 3.7 03/31/2023    CO2 25.0 03/31/2023    CALCIUM 8.1 (L) 03/31/2023    ALBUMIN 3.2 (L) 03/31/2023    ALKPHOS 82 03/31/2023    BILITOT 1.0 03/31/2023    ALT 10 03/31/2023    AST 17 03/31/2023       Assessment:      Severe acute pancreatitis    Alcohol withdrawal (HCC)    Lactic acidosis      Plan:  >> Increase lactated Ringer's to 250 mL an " hour  >> Continue supportive care and pain management    Arya Riojas, APRN  03/31/23  13:11 EDT

## 2023-03-31 NOTE — CASE MANAGEMENT/SOCIAL WORK
Continued Stay Note  [facility]     Patient Name: Robson Miller  MRN: [id number]  Today's Date: 3/31/2023    Admit Date: 3/30/2023    Plan: Home   Discharge Plan     Row Name 03/31/23 1019       Plan    Plan Home    Patient/Family in Agreement with Plan yes    Plan Comments Spoke with patient at bedside. Patient discharge plan is home with private transport. States that his insurance is [employer] through [employer] | [employer] | invest plan. No discharge needs.    Final Discharge Disposition Code 01 - home or self-care               Discharge Codes    No documentation.               Expected Discharge Date and Time     Expected Discharge Date Expected Discharge Time    Apr 1, 2023             Josefina Cage RN

## 2023-03-31 NOTE — PLAN OF CARE
Goal Outcome Evaluation:  Plan of Care Reviewed With: patient           Outcome Evaluation: VSS, Sinus tach on tele-monitor, RA while awake, 2LNC while sleeping. CIWA no higher than 4 this shift. Prn pain medications given multiple times during shift, amb to BR and back to bed without difficulty. Will cont. POC

## 2023-03-31 NOTE — PROGRESS NOTES
[facility] Medicine Services  PROGRESS NOTE    Patient Name: Robson Miller  : 1990  MRN: [id number]    Date of Admission: 3/30/2023  Primary Care Physician: Claudette Ho MD    Subjective   Subjective     CC:  F/U abdominal pain    HPI:  Seen this morning, he had some severe pain a little while ago but has received pain medication and is now able to rest. Tolerated some clear liquids without any major issues. Denies SOA.    ROS:  Gen-no fevers, no chills  CV-no chest pain, no palpitations  Resp-no cough, no dyspnea  GI-no N/V/D, +abd pain       Objective   Objective     Vital Signs:   Temp:  [98 °F (36.7 °C)-98.9 °F (37.2 °C)] 98.4 °F (36.9 °C)  Heart Rate:  [] 125  Resp:  [18] 18  BP: (119-155)/() 143/108     Physical Exam:  Gen-no acute distress  HENT-NCAT, mucous membranes moist  CV-RRR, S1 S2 normal, no m/r/g  Resp-CTAB, no wheezes or rales  Abd-soft, significantly tender to palpation across entire upper abdomen, ND, +BS  Ext-no edema  Neuro-A&Ox3, no focal deficits  Skin-no rashes  Psych-appropriate mood      Results Reviewed:  LAB RESULTS:      Lab 23  0930 23  1150 23  0829   WBC 10.08  --  15.53*   HEMOGLOBIN 13.7  --  17.8*   HEMATOCRIT 38.8  --  51.0   PLATELETS 185  --  317   NEUTROS ABS 8.17*  --  13.31*   IMMATURE GRANS (ABS) 0.03  --  0.12*   LYMPHS ABS 0.97  --  0.95   MONOS ABS 0.67  --  1.08*   EOS ABS 0.19  --  0.01   MCV 96.3  --  95.9   LACTATE  --  1.2 4.4*         Lab 23  0348 23  0829   SODIUM 136 131*   POTASSIUM 3.7 3.7   CHLORIDE 99 90*   CO2 25.0 19.0*   ANION GAP 12.0 22.0*   BUN 5* 6   CREATININE 0.73* 0.86   EGFR 124.0 118.0   GLUCOSE 84 145*   CALCIUM 8.1* 10.4   MAGNESIUM 1.3*  --    PHOSPHORUS 2.1*  --    HEMOGLOBIN A1C  --  5.30         Lab 23  0348 23  0829   TOTAL PROTEIN 5.1* 7.4   ALBUMIN 3.2* 4.7   GLOBULIN 1.9 2.7   ALT (SGPT) 10 20   AST (SGOT) 17 37   BILIRUBIN 1.0 1.5*   ALK  PHOS 82 123*   LIPASE 841* 656*             Lab 03/31/23  0348   CHOLESTEROL 150   LDL CHOL 73   HDL CHOL 58   TRIGLYCERIDES 103             Brief Urine Lab Results  (Last result in the past 365 days)      Color   Clarity   Blood   Leuk Est   Nitrite   Protein   CREAT   Urine HCG        03/30/23 0910 [place]   Cloudy   Trace   Negative   Positive   100 mg/dL (2+)                 Microbiology Results Abnormal     None          CT Abdomen Pelvis Without Contrast    Result Date: 3/30/2023  CT ABDOMEN PELVIS WO CONTRAST Date of Exam: 3/30/2023 8:54 AM EDT Indication: Epigastric pain Comparison: None available. Technique: Axial CT images were obtained of the abdomen and pelvis without the administration of contrast. Reconstructed coronal and sagittal images were also obtained. Automated exposure control and iterative construction methods were used. Findings: The lung bases are clear. Evaluation of the body wall soft tissues demonstrates no acute or suspicious findings. Evaluation of the osseous structures demonstrates no evidence of acute fracture or aggressive osseous lesion. The liver demonstrates homogeneous low attenuation, consistent with steatosis. There is no biliary ductal dilatation or suspicious focal parenchymal lesion on limited noncontrast exam. The gallbladder appears unremarkable. The spleen is homogeneous. There is severe extensive pancreatic parenchymal and peripancreatic edema consistent with acute pancreatitis. Evaluation for necrosis is somewhat limited on noncontrast exam. Some ill-defined adjacent fluid is present along the uncinate and there is also free fluid layering in the pelvis. There is otherwise no definite focal pancreatic fluid collection concerning for pseudocyst or acute necrotic collection. The kidneys are normal. Small and large bowel loops are nondilated, without evidence of obstruction. There is some mild wall edema present involving segments of small bowel and transverse colon with  air-fluid levels present, suggesting likely secondary enteritis/colitis. There is no overt pneumoperitoneum. The pelvic viscera demonstrate no additional acute findings. Nonaneurysmal abdominal aorta. Scattered likely reactive mediastinal and small bowel mesenteric lymph nodes are present.     Impression: Impression: Findings of severe acute pancreatitis with diffuse pancreatic parenchymal edema, peripancreatic edema, adjacent free fluid and mesenteric edema as well as layering fluid in the pelvis and free retroperitoneal fluid along the right paracolic gutter. There  is no evidence of focal pancreatic fluid collection. Evaluation for necrosis is limited on noncontrast exam. Some mild colonic and small bowel wall edema is present, suggesting concurrent, likely secondary enteritis/colitis. Diffuse hepatic steatosis Electronically Signed: Se Bowie  3/30/2023 9:22 AM EDT  Workstation ID: [id number]          Current medications:  Scheduled Meds:cloNIDine, 0.1 mg, Oral, Q6H  diazePAM, 10 mg, Oral, Q6H  enoxaparin, 40 mg, Subcutaneous, Nightly  magnesium sulfate, 2 g, Intravenous, Q2H  senna-docusate sodium, 2 tablet, Oral, BID  sodium chloride, 10 mL, Intravenous, Q12H      Continuous Infusions:lactated ringers, 250 mL/hr      PRN Meds:.•  acetaminophen **OR** acetaminophen **OR** acetaminophen  •  senna-docusate sodium **AND** polyethylene glycol **AND** bisacodyl **AND** bisacodyl  •  Calcium Replacement - Follow Nurse / BPA Driven Protocol  •  HYDROmorphone  •  ketorolac  •  LORazepam **OR** LORazepam **OR** LORazepam **OR** LORazepam **OR** LORazepam **OR** LORazepam  •  Magnesium Standard Dose Replacement - Follow Nurse / BPA Driven Protocol  •  ondansetron **OR** ondansetron  •  Phosphorus Replacement - Follow Nurse / BPA Driven Protocol  •  Potassium Replacement - Follow Nurse / BPA Driven Protocol  •  prochlorperazine  •  Sodium Chloride (PF)  •  sodium chloride  •  sodium chloride    Assessment & Plan    Assessment & Plan     Active Hospital Problems    Diagnosis  POA   • **Severe acute pancreatitis [K85.90]  Unknown   • Alcohol withdrawal (HCC) [F10.939]  Yes   • Lactic acidosis [E87.20]  Unknown      Resolved Hospital Problems   No resolved problems to display.        Brief Hospital Course to date:  Robson Miller is a 32 y.o. male with hx of alcohol abuse presents due to abdominal pain. Found to have severe acute pancreatitis. GI has been consulted to assist with management.    *All problems are new to me today.    Severe acute pancreatitis due to alcohol  Alcoholic ketoacidosis  Lactic acidosis  --CT A/P reviewed, severe acute pancreatitis noted with adjacent free fluid and mesenteric edema with layering fluid in the pelvis and free retroperitoneal fluid along the right paracolic gutter; no necrosis or pancreatic fluid collection noted; diffuse hepatic steatosis  --Continue aggressive IV fluids, monitor for signs/symptoms of volume overload (discussed with patient)  --Pain control with IV Dilaudid, Toradol  --GI following, appreciate their assistance  --Lipase worse today at 841  --At high risk for clinical decompensation    Alcohol abuse  --Reports 10-12 drinks daily, bourbon or gin  --CIWA  --Vitamins  --Scheduled Valium 10 mg every 6 hours  --PRN Ativan    High risk patient: severe pancreatitis, managing IV pain medications, alcohol withdrawal requiring high doses of benzo's.    Expected Discharge Location and Transportation: home  Expected Discharge   Expected Discharge Date and Time     Expected Discharge Date Expected Discharge Time    Apr 3, 2023            DVT prophylaxis:  Medical DVT prophylaxis orders are present.          CODE STATUS:   Code Status and Medical Interventions:   Ordered at: 03/30/23 8296     Level Of Support Discussed With:    Patient     Code Status (Patient has no pulse and is not breathing):    CPR (Attempt to Resuscitate)     Medical Interventions (Patient has pulse or is  breathing):    Full Support       Arielle Ramos MD  03/31/23

## 2023-03-31 NOTE — PLAN OF CARE
Problem: Adult Inpatient Plan of Care  Goal: Plan of Care Review  Outcome: Ongoing, Progressing  Flowsheets (Taken 3/31/2023 0450)  Progress: improving  Plan of Care Reviewed With: patient  Outcome Evaluation: No acute events overnight. Slept on and off. CIWA=2.  Pain controlled with PRN med. VSS. No concerns at this time. Will cont with POC   Goal Outcome Evaluation:  Plan of Care Reviewed With: patient        Progress: improving  Outcome Evaluation: No acute events overnight. Slept on and off. CIWA=2.  Pain controlled with PRN med. VSS. No concerns at this time. Will cont with POC

## 2023-04-01 LAB
ALBUMIN SERPL-MCNC: 3 G/DL (ref 3.5–5.2)
ALBUMIN/GLOB SERPL: 1.6 G/DL
ALP SERPL-CCNC: 131 U/L (ref 39–117)
ALT SERPL W P-5'-P-CCNC: 15 U/L (ref 1–41)
ANION GAP SERPL CALCULATED.3IONS-SCNC: 10 MMOL/L (ref 5–15)
AST SERPL-CCNC: 32 U/L (ref 1–40)
BILIRUB SERPL-MCNC: 0.9 MG/DL (ref 0–1.2)
BUN SERPL-MCNC: 4 MG/DL (ref 6–20)
BUN/CREAT SERPL: 6.5 (ref 7–25)
CALCIUM SPEC-SCNC: 7.4 MG/DL (ref 8.6–10.5)
CHLORIDE SERPL-SCNC: 99 MMOL/L (ref 98–107)
CO2 SERPL-SCNC: 26 MMOL/L (ref 22–29)
CREAT SERPL-MCNC: 0.62 MG/DL (ref 0.76–1.27)
DEPRECATED RDW RBC AUTO: 50.4 FL (ref 37–54)
EGFRCR SERPLBLD CKD-EPI 2021: 130.2 ML/MIN/1.73
ERYTHROCYTE [DISTWIDTH] IN BLOOD BY AUTOMATED COUNT: 13.7 % (ref 12.3–15.4)
GLOBULIN UR ELPH-MCNC: 1.9 GM/DL
GLUCOSE SERPL-MCNC: 80 MG/DL (ref 65–99)
HCT VFR BLD AUTO: 38.4 % (ref 37.5–51)
HGB BLD-MCNC: 12.8 G/DL (ref 13–17.7)
LIPASE SERPL-CCNC: 183 U/L (ref 13–60)
MCH RBC QN AUTO: 33.4 PG (ref 26.6–33)
MCHC RBC AUTO-ENTMCNC: 33.3 G/DL (ref 31.5–35.7)
MCV RBC AUTO: 100.3 FL (ref 79–97)
PLATELET # BLD AUTO: 178 10*3/MM3 (ref 140–450)
PMV BLD AUTO: 9.4 FL (ref 6–12)
POTASSIUM SERPL-SCNC: 3.2 MMOL/L (ref 3.5–5.2)
POTASSIUM SERPL-SCNC: 4.1 MMOL/L (ref 3.5–5.2)
PROT SERPL-MCNC: 4.9 G/DL (ref 6–8.5)
RBC # BLD AUTO: 3.83 10*6/MM3 (ref 4.14–5.8)
SODIUM SERPL-SCNC: 135 MMOL/L (ref 136–145)
WBC NRBC COR # BLD: 9.3 10*3/MM3 (ref 3.4–10.8)

## 2023-04-01 PROCEDURE — 63710000001 ONDANSETRON PER 8 MG: Performed by: FAMILY MEDICINE

## 2023-04-01 PROCEDURE — 25010000002 HYDROMORPHONE PER 4 MG: Performed by: FAMILY MEDICINE

## 2023-04-01 PROCEDURE — 84132 ASSAY OF SERUM POTASSIUM: CPT | Performed by: HOSPITALIST

## 2023-04-01 PROCEDURE — 25010000002 KETOROLAC TROMETHAMINE PER 15 MG: Performed by: FAMILY MEDICINE

## 2023-04-01 PROCEDURE — 99232 SBSQ HOSP IP/OBS MODERATE 35: CPT | Performed by: HOSPITALIST

## 2023-04-01 PROCEDURE — 83690 ASSAY OF LIPASE: CPT | Performed by: HOSPITALIST

## 2023-04-01 PROCEDURE — 85027 COMPLETE CBC AUTOMATED: CPT | Performed by: HOSPITALIST

## 2023-04-01 PROCEDURE — 80053 COMPREHEN METABOLIC PANEL: CPT | Performed by: HOSPITALIST

## 2023-04-01 PROCEDURE — 25010000002 HYDROMORPHONE PER 4 MG: Performed by: HOSPITALIST

## 2023-04-01 PROCEDURE — 25010000002 ENOXAPARIN PER 10 MG: Performed by: FAMILY MEDICINE

## 2023-04-01 RX ORDER — ACETAMINOPHEN 325 MG/1
650 TABLET ORAL EVERY 4 HOURS PRN
Status: DISCONTINUED | OUTPATIENT
Start: 2023-04-01 | End: 2023-04-13 | Stop reason: HOSPADM

## 2023-04-01 RX ORDER — DIAPER,BRIEF,INFANT-TODD,DISP
1 EACH MISCELLANEOUS EVERY 12 HOURS SCHEDULED
Status: DISCONTINUED | OUTPATIENT
Start: 2023-04-01 | End: 2023-04-13 | Stop reason: HOSPADM

## 2023-04-01 RX ORDER — ACETAMINOPHEN 160 MG/5ML
650 SOLUTION ORAL EVERY 4 HOURS PRN
Status: DISCONTINUED | OUTPATIENT
Start: 2023-04-01 | End: 2023-04-13 | Stop reason: HOSPADM

## 2023-04-01 RX ORDER — POTASSIUM CHLORIDE 750 MG/1
40 CAPSULE, EXTENDED RELEASE ORAL EVERY 4 HOURS
Status: COMPLETED | OUTPATIENT
Start: 2023-04-01 | End: 2023-04-01

## 2023-04-01 RX ORDER — HYDROMORPHONE HYDROCHLORIDE 1 MG/ML
0.5 INJECTION, SOLUTION INTRAMUSCULAR; INTRAVENOUS; SUBCUTANEOUS
Status: DISCONTINUED | OUTPATIENT
Start: 2023-04-01 | End: 2023-04-02

## 2023-04-01 RX ORDER — DIAZEPAM 5 MG/1
10 TABLET ORAL EVERY 8 HOURS
Status: DISCONTINUED | OUTPATIENT
Start: 2023-04-01 | End: 2023-04-02

## 2023-04-01 RX ORDER — OXYCODONE AND ACETAMINOPHEN 7.5; 325 MG/1; MG/1
1 TABLET ORAL EVERY 4 HOURS PRN
Status: DISCONTINUED | OUTPATIENT
Start: 2023-04-01 | End: 2023-04-05

## 2023-04-01 RX ORDER — POLYETHYLENE GLYCOL 3350 17 G
2 POWDER IN PACKET (EA) ORAL
Status: DISCONTINUED | OUTPATIENT
Start: 2023-04-01 | End: 2023-04-13 | Stop reason: HOSPADM

## 2023-04-01 RX ORDER — ACETAMINOPHEN 650 MG/1
650 SUPPOSITORY RECTAL EVERY 4 HOURS PRN
Status: DISCONTINUED | OUTPATIENT
Start: 2023-04-01 | End: 2023-04-13 | Stop reason: HOSPADM

## 2023-04-01 RX ORDER — CLONIDINE HYDROCHLORIDE 0.1 MG/1
0.1 TABLET ORAL EVERY 6 HOURS PRN
Status: DISCONTINUED | OUTPATIENT
Start: 2023-04-01 | End: 2023-04-05

## 2023-04-01 RX ADMIN — HYDROMORPHONE HYDROCHLORIDE 0.5 MG: 1 INJECTION, SOLUTION INTRAMUSCULAR; INTRAVENOUS; SUBCUTANEOUS at 14:21

## 2023-04-01 RX ADMIN — SODIUM CHLORIDE, POTASSIUM CHLORIDE, SODIUM LACTATE AND CALCIUM CHLORIDE 250 ML/HR: 600; 310; 30; 20 INJECTION, SOLUTION INTRAVENOUS at 05:42

## 2023-04-01 RX ADMIN — KETOROLAC TROMETHAMINE 30 MG: 30 INJECTION, SOLUTION INTRAMUSCULAR; INTRAVENOUS at 06:17

## 2023-04-01 RX ADMIN — Medication 10 ML: at 08:16

## 2023-04-01 RX ADMIN — DIAZEPAM 10 MG: 5 TABLET ORAL at 11:05

## 2023-04-01 RX ADMIN — DIAZEPAM 10 MG: 5 TABLET ORAL at 05:40

## 2023-04-01 RX ADMIN — HYDROMORPHONE HYDROCHLORIDE 0.5 MG: 1 INJECTION, SOLUTION INTRAMUSCULAR; INTRAVENOUS; SUBCUTANEOUS at 21:13

## 2023-04-01 RX ADMIN — CLONIDINE HYDROCHLORIDE 0.1 MG: 0.1 TABLET ORAL at 11:05

## 2023-04-01 RX ADMIN — HYDROCORTISONE 1 APPLICATION: 1 CREAM TOPICAL at 21:14

## 2023-04-01 RX ADMIN — ONDANSETRON 4 MG: 4 TABLET ORAL at 21:13

## 2023-04-01 RX ADMIN — KETOROLAC TROMETHAMINE 30 MG: 30 INJECTION, SOLUTION INTRAMUSCULAR; INTRAVENOUS at 23:41

## 2023-04-01 RX ADMIN — HYDROCORTISONE 1 APPLICATION: 1 CREAM TOPICAL at 13:44

## 2023-04-01 RX ADMIN — HYDROMORPHONE HYDROCHLORIDE 0.5 MG: 1 INJECTION, SOLUTION INTRAMUSCULAR; INTRAVENOUS; SUBCUTANEOUS at 11:12

## 2023-04-01 RX ADMIN — HYDROMORPHONE HYDROCHLORIDE 0.5 MG: 1 INJECTION, SOLUTION INTRAMUSCULAR; INTRAVENOUS; SUBCUTANEOUS at 03:06

## 2023-04-01 RX ADMIN — Medication 10 ML: at 21:13

## 2023-04-01 RX ADMIN — HYDROMORPHONE HYDROCHLORIDE 0.5 MG: 1 INJECTION, SOLUTION INTRAMUSCULAR; INTRAVENOUS; SUBCUTANEOUS at 18:10

## 2023-04-01 RX ADMIN — SODIUM CHLORIDE, POTASSIUM CHLORIDE, SODIUM LACTATE AND CALCIUM CHLORIDE 250 ML/HR: 600; 310; 30; 20 INJECTION, SOLUTION INTRAVENOUS at 01:15

## 2023-04-01 RX ADMIN — POTASSIUM CHLORIDE 40 MEQ: 10 CAPSULE, COATED, EXTENDED RELEASE ORAL at 11:06

## 2023-04-01 RX ADMIN — ENOXAPARIN SODIUM 40 MG: 40 INJECTION SUBCUTANEOUS at 21:13

## 2023-04-01 RX ADMIN — SODIUM CHLORIDE, POTASSIUM CHLORIDE, SODIUM LACTATE AND CALCIUM CHLORIDE 250 ML/HR: 600; 310; 30; 20 INJECTION, SOLUTION INTRAVENOUS at 11:05

## 2023-04-01 RX ADMIN — HYDROMORPHONE HYDROCHLORIDE 0.5 MG: 1 INJECTION, SOLUTION INTRAMUSCULAR; INTRAVENOUS; SUBCUTANEOUS at 08:15

## 2023-04-01 RX ADMIN — POTASSIUM CHLORIDE 40 MEQ: 10 CAPSULE, COATED, EXTENDED RELEASE ORAL at 08:16

## 2023-04-01 RX ADMIN — CLONIDINE HYDROCHLORIDE 0.1 MG: 0.1 TABLET ORAL at 05:40

## 2023-04-01 RX ADMIN — DIAZEPAM 10 MG: 5 TABLET ORAL at 17:21

## 2023-04-01 RX ADMIN — NICOTINE POLACRILEX 2 MG: 2 LOZENGE ORAL at 13:44

## 2023-04-01 NOTE — PROGRESS NOTES
[facility] Medicine Services  PROGRESS NOTE    Patient Name: Robson Miller  : 1990  MRN: [id number]    Date of Admission: 3/30/2023  Primary Care Physician: Claudette Ho MD    Subjective   Subjective     CC:  F/U abdominal pain    HPI:  Seen this morning, still with pain but has tolerated clear liquids. Would like to advance diet today.    ROS:  Gen-no fevers, no chills  CV-no chest pain, no palpitations  Resp-no cough, no dyspnea  GI-no N/V/D, +abd pain       Objective   Objective     Vital Signs:   Temp:  [98.3 °F (36.8 °C)-99.4 °F (37.4 °C)] 99.2 °F (37.3 °C)  Heart Rate:  [100-138] 103  Resp:  [18] 18  BP: (113-143)/() 113/83  Flow (L/min):  [2] 2     Physical Exam:  Gen-no acute distress  HENT-NCAT, mucous membranes moist  CV-RRR, S1 S2 normal, no m/r/g  Resp-CTAB, no wheezes or rales  Abd-soft, tender to palpation across entire upper abdomen but seems less so today, ND, +BS  Ext-no edema  Neuro-A&Ox3, no focal deficits  Skin-no rashes  Psych-appropriate mood      Results Reviewed:  LAB RESULTS:      Lab 23  0551 23  0930 23  1150 23  0829   WBC 9.30 10.08  --  15.53*   HEMOGLOBIN 12.8* 13.7  --  17.8*   HEMATOCRIT 38.4 38.8  --  51.0   PLATELETS 178 185  --  317   NEUTROS ABS  --  8.17*  --  13.31*   IMMATURE GRANS (ABS)  --  0.03  --  0.12*   LYMPHS ABS  --  0.97  --  0.95   MONOS ABS  --  0.67  --  1.08*   EOS ABS  --  0.19  --  0.01   .3* 96.3  --  95.9   LACTATE  --   --  1.2 4.4*         Lab 23  0551 23  0348 23  0829   SODIUM 135* 136 131*   POTASSIUM 3.2* 3.7 3.7   CHLORIDE 99 99 90*   CO2 26.0 25.0 19.0*   ANION GAP 10.0 12.0 22.0*   BUN 4* 5* 6   CREATININE 0.62* 0.73* 0.86   EGFR 130.2 124.0 118.0   GLUCOSE 80 84 145*   CALCIUM 7.4* 8.1* 10.4   MAGNESIUM  --  1.3*  --    PHOSPHORUS  --  2.1*  --    HEMOGLOBIN A1C  --   --  5.30         Lab 23  0551 23  0348 23  0829   TOTAL PROTEIN  4.9* 5.1* 7.4   ALBUMIN 3.0* 3.2* 4.7   GLOBULIN 1.9 1.9 2.7   ALT (SGPT) 15 10 20   AST (SGOT) 32 17 37   BILIRUBIN 0.9 1.0 1.5*   ALK PHOS 131* 82 123*   LIPASE 183* 841* 656*             Lab 03/31/23  0348   CHOLESTEROL 150   LDL CHOL 73   HDL CHOL 58   TRIGLYCERIDES 103             Brief Urine Lab Results  (Last result in the past 365 days)      Color   Clarity   Blood   Leuk Est   Nitrite   Protein   CREAT   Urine HCG        03/30/23 0910 [place]   Cloudy   Trace   Negative   Positive   100 mg/dL (2+)                 Microbiology Results Abnormal     None          No radiology results from the last 24 hrs        Current medications:  Scheduled Meds:diazePAM, 10 mg, Oral, Q8H  enoxaparin, 40 mg, Subcutaneous, Nightly  hydrocortisone, 1 application, Topical, Q12H  senna-docusate sodium, 2 tablet, Oral, BID  sodium chloride, 10 mL, Intravenous, Q12H      Continuous Infusions:lactated ringers, 250 mL/hr, Last Rate: 250 mL/hr (04/01/23 1105)      PRN Meds:.•  acetaminophen **OR** acetaminophen **OR** acetaminophen  •  senna-docusate sodium **AND** polyethylene glycol **AND** bisacodyl **AND** bisacodyl  •  Calcium Replacement - Follow Nurse / BPA Driven Protocol  •  cloNIDine  •  HYDROmorphone  •  ketorolac  •  LORazepam **OR** LORazepam **OR** LORazepam **OR** LORazepam **OR** LORazepam **OR** LORazepam  •  Magnesium Standard Dose Replacement - Follow Nurse / BPA Driven Protocol  •  nicotine polacrilex  •  ondansetron **OR** ondansetron  •  oxyCODONE-acetaminophen  •  Phosphorus Replacement - Follow Nurse / BPA Driven Protocol  •  Potassium Replacement - Follow Nurse / BPA Driven Protocol  •  prochlorperazine  •  Sodium Chloride (PF)  •  sodium chloride  •  sodium chloride    Assessment & Plan   Assessment & Plan     Active Hospital Problems    Diagnosis  POA   • **Severe acute pancreatitis [K85.90]  Unknown   • Alcohol withdrawal [F10.939]  Yes   • Lactic acidosis [E87.20]  Unknown      Resolved Hospital Problems    No resolved problems to display.        Brief Hospital Course to date:  Robson Miller is a 32 y.o. male with hx of alcohol abuse presents due to abdominal pain. Found to have severe acute pancreatitis. GI has been consulted to assist with management.    Severe acute pancreatitis due to alcohol  Alcoholic ketoacidosis  Lactic acidosis  --CT A/P with severe acute pancreatitis noted with adjacent free fluid and mesenteric edema with layering fluid in the pelvis and free retroperitoneal fluid along the right paracolic gutter; no necrosis or pancreatic fluid collection noted; diffuse hepatic steatosis  --Lipid profile normal  --Continue aggressive IV fluids, monitor for signs/symptoms of volume overload (discussed with patient)  --Pain control with IV Dilaudid, Toradol -- as he is tolerating PO diet, will add PRN Percocet today, start weaning IV Dilaudid slowly  --GI following, appreciate their assistance  --Lipase improved today 841-->183  --At high risk for clinical decompensation    Hypomagnesemia  Hypokalemia  --Replacement protocol ordered    Alcohol abuse  --Reports 10-12 drinks daily, bourbon or gin  --CIWA  --Vitamins  --Scheduled Valium 10 mg every 6 hours -- will wean to Q8H today  --PRN Ativan    Expected Discharge Location and Transportation: home  Expected Discharge   Expected Discharge Date and Time     Expected Discharge Date Expected Discharge Time    Apr 3, 2023            DVT prophylaxis:  Medical DVT prophylaxis orders are present.          CODE STATUS:   Code Status and Medical Interventions:   Ordered at: 03/30/23 1416     Level Of Support Discussed With:    Patient     Code Status (Patient has no pulse and is not breathing):    CPR (Attempt to Resuscitate)     Medical Interventions (Patient has pulse or is breathing):    Full Support       Arielle Ramos MD  04/01/23

## 2023-04-01 NOTE — PLAN OF CARE
Goal Outcome Evaluation:  Plan of Care Reviewed With: patient           Outcome Evaluation: VSS. Pt on RA and 2L while sleeping. CIWA was no higher than 2 on shift. PRN meds given for pain. Pt ambulates well. No acute events on shift. Will continue plan of care.

## 2023-04-02 LAB
ANION GAP SERPL CALCULATED.3IONS-SCNC: 8 MMOL/L (ref 5–15)
BUN SERPL-MCNC: 2 MG/DL (ref 6–20)
BUN/CREAT SERPL: 3.1 (ref 7–25)
CALCIUM SPEC-SCNC: 7.9 MG/DL (ref 8.6–10.5)
CHLORIDE SERPL-SCNC: 106 MMOL/L (ref 98–107)
CO2 SERPL-SCNC: 25 MMOL/L (ref 22–29)
CREAT SERPL-MCNC: 0.65 MG/DL (ref 0.76–1.27)
DEPRECATED RDW RBC AUTO: 51.2 FL (ref 37–54)
EGFRCR SERPLBLD CKD-EPI 2021: 128.4 ML/MIN/1.73
ERYTHROCYTE [DISTWIDTH] IN BLOOD BY AUTOMATED COUNT: 13.8 % (ref 12.3–15.4)
GLUCOSE SERPL-MCNC: 109 MG/DL (ref 65–99)
HCT VFR BLD AUTO: 38.1 % (ref 37.5–51)
HGB BLD-MCNC: 13 G/DL (ref 13–17.7)
LIPASE SERPL-CCNC: 72 U/L (ref 13–60)
MCH RBC QN AUTO: 34.5 PG (ref 26.6–33)
MCHC RBC AUTO-ENTMCNC: 34.1 G/DL (ref 31.5–35.7)
MCV RBC AUTO: 101.1 FL (ref 79–97)
PLATELET # BLD AUTO: 203 10*3/MM3 (ref 140–450)
PMV BLD AUTO: 9 FL (ref 6–12)
POTASSIUM SERPL-SCNC: 3.9 MMOL/L (ref 3.5–5.2)
RBC # BLD AUTO: 3.77 10*6/MM3 (ref 4.14–5.8)
SODIUM SERPL-SCNC: 139 MMOL/L (ref 136–145)
WBC NRBC COR # BLD: 9.13 10*3/MM3 (ref 3.4–10.8)

## 2023-04-02 PROCEDURE — 25010000002 ENOXAPARIN PER 10 MG: Performed by: FAMILY MEDICINE

## 2023-04-02 PROCEDURE — 99233 SBSQ HOSP IP/OBS HIGH 50: CPT | Performed by: HOSPITALIST

## 2023-04-02 PROCEDURE — 85027 COMPLETE CBC AUTOMATED: CPT | Performed by: HOSPITALIST

## 2023-04-02 PROCEDURE — 25010000002 KETOROLAC TROMETHAMINE PER 15 MG: Performed by: FAMILY MEDICINE

## 2023-04-02 PROCEDURE — 80048 BASIC METABOLIC PNL TOTAL CA: CPT | Performed by: HOSPITALIST

## 2023-04-02 PROCEDURE — 99232 SBSQ HOSP IP/OBS MODERATE 35: CPT | Performed by: NURSE PRACTITIONER

## 2023-04-02 PROCEDURE — 83690 ASSAY OF LIPASE: CPT | Performed by: HOSPITALIST

## 2023-04-02 PROCEDURE — 25010000002 HYDROMORPHONE PER 4 MG: Performed by: HOSPITALIST

## 2023-04-02 RX ORDER — DIAZEPAM 5 MG/1
5 TABLET ORAL EVERY 8 HOURS
Status: DISCONTINUED | OUTPATIENT
Start: 2023-04-02 | End: 2023-04-03

## 2023-04-02 RX ORDER — HYDROMORPHONE HYDROCHLORIDE 1 MG/ML
0.5 INJECTION, SOLUTION INTRAMUSCULAR; INTRAVENOUS; SUBCUTANEOUS EVERY 4 HOURS PRN
Status: DISCONTINUED | OUTPATIENT
Start: 2023-04-02 | End: 2023-04-05

## 2023-04-02 RX ADMIN — LORAZEPAM 1 MG: 1 TABLET ORAL at 20:40

## 2023-04-02 RX ADMIN — ACETAMINOPHEN 325MG 650 MG: 325 TABLET ORAL at 09:21

## 2023-04-02 RX ADMIN — KETOROLAC TROMETHAMINE 30 MG: 30 INJECTION, SOLUTION INTRAMUSCULAR; INTRAVENOUS at 20:31

## 2023-04-02 RX ADMIN — OXYCODONE HYDROCHLORIDE AND ACETAMINOPHEN 1 TABLET: 7.5; 325 TABLET ORAL at 10:22

## 2023-04-02 RX ADMIN — OXYCODONE HYDROCHLORIDE AND ACETAMINOPHEN 1 TABLET: 7.5; 325 TABLET ORAL at 18:12

## 2023-04-02 RX ADMIN — HYDROCORTISONE 1 APPLICATION: 1 CREAM TOPICAL at 20:23

## 2023-04-02 RX ADMIN — LORAZEPAM 1 MG: 1 TABLET ORAL at 13:03

## 2023-04-02 RX ADMIN — HYDROMORPHONE HYDROCHLORIDE 0.5 MG: 1 INJECTION, SOLUTION INTRAMUSCULAR; INTRAVENOUS; SUBCUTANEOUS at 17:48

## 2023-04-02 RX ADMIN — CLONIDINE HYDROCHLORIDE 0.1 MG: 0.1 TABLET ORAL at 20:40

## 2023-04-02 RX ADMIN — NICOTINE POLACRILEX 2 MG: 2 LOZENGE ORAL at 00:09

## 2023-04-02 RX ADMIN — KETOROLAC TROMETHAMINE 30 MG: 30 INJECTION, SOLUTION INTRAMUSCULAR; INTRAVENOUS at 09:16

## 2023-04-02 RX ADMIN — SODIUM CHLORIDE, POTASSIUM CHLORIDE, SODIUM LACTATE AND CALCIUM CHLORIDE 250 ML/HR: 600; 310; 30; 20 INJECTION, SOLUTION INTRAVENOUS at 17:47

## 2023-04-02 RX ADMIN — HYDROMORPHONE HYDROCHLORIDE 0.5 MG: 1 INJECTION, SOLUTION INTRAMUSCULAR; INTRAVENOUS; SUBCUTANEOUS at 00:51

## 2023-04-02 RX ADMIN — SODIUM CHLORIDE, POTASSIUM CHLORIDE, SODIUM LACTATE AND CALCIUM CHLORIDE 250 ML/HR: 600; 310; 30; 20 INJECTION, SOLUTION INTRAVENOUS at 10:22

## 2023-04-02 RX ADMIN — SODIUM CHLORIDE, POTASSIUM CHLORIDE, SODIUM LACTATE AND CALCIUM CHLORIDE 250 ML/HR: 600; 310; 30; 20 INJECTION, SOLUTION INTRAVENOUS at 01:53

## 2023-04-02 RX ADMIN — OXYCODONE HYDROCHLORIDE AND ACETAMINOPHEN 1 TABLET: 7.5; 325 TABLET ORAL at 14:20

## 2023-04-02 RX ADMIN — SENNOSIDES AND DOCUSATE SODIUM 2 TABLET: 8.6; 5 TABLET ORAL at 20:23

## 2023-04-02 RX ADMIN — DIAZEPAM 5 MG: 5 TABLET ORAL at 17:48

## 2023-04-02 RX ADMIN — Medication 10 ML: at 20:23

## 2023-04-02 RX ADMIN — DIAZEPAM 10 MG: 5 TABLET ORAL at 09:10

## 2023-04-02 RX ADMIN — NICOTINE POLACRILEX 2 MG: 2 LOZENGE ORAL at 20:22

## 2023-04-02 RX ADMIN — ENOXAPARIN SODIUM 40 MG: 40 INJECTION SUBCUTANEOUS at 20:22

## 2023-04-02 RX ADMIN — HYDROMORPHONE HYDROCHLORIDE 0.5 MG: 1 INJECTION, SOLUTION INTRAMUSCULAR; INTRAVENOUS; SUBCUTANEOUS at 12:40

## 2023-04-02 RX ADMIN — HYDROMORPHONE HYDROCHLORIDE 0.5 MG: 1 INJECTION, SOLUTION INTRAMUSCULAR; INTRAVENOUS; SUBCUTANEOUS at 09:10

## 2023-04-02 RX ADMIN — DIAZEPAM 10 MG: 5 TABLET ORAL at 01:24

## 2023-04-02 NOTE — PROGRESS NOTES
[facility] Medicine Services  PROGRESS NOTE    Patient Name: Robson Miller  : 1990  MRN: [id number]    Date of Admission: 3/30/2023  Primary Care Physician: Claudette Ho MD    Subjective   Subjective     CC:  F/U abdominal pain    HPI:  Seen this morning, still with pain, does not like any of the clear/full liquids we are providing so not taking in a lot. He says GI came in earlier and said he may need a feeding tube which he is concerned about. During our conversation he became tearful and asked if he was going to die. Explained that in cases of severe pancreatitis, temporary alternative methods of nutrition are necessary while the pancreas continues to try to heal. He remains anxious, he thought he was getting better and someone had told him he would be going home today.     ROS:  Gen-no fevers, no chills  CV-no chest pain, no palpitations  Resp-no cough, no dyspnea  GI-no N/V/D, +abd pain       Objective   Objective     Vital Signs:   Temp:  [98.3 °F (36.8 °C)-100.7 °F (38.2 °C)] 98.3 °F (36.8 °C)  Heart Rate:  [105-138] 105  Resp:  [16-18] 16  BP: (131-148)/() 139/99  Flow (L/min):  [2] 2     Physical Exam:  Gen-no acute distress  HENT-NCAT, mucous membranes moist  CV-tachycardic, S1 S2 normal, no m/r/g  Resp-CTAB, no wheezes or rales  Abd-soft, mild to moderate TTP across entire upper abdomen, ND, +BS  Ext-no edema  Neuro-A&Ox3, no focal deficits  Skin-no rashes  Psych-anxious, tearful      Results Reviewed:  LAB RESULTS:      Lab 23  0525 23  0551 23  0930 23  1150 23  0829   WBC 9.13 9.30 10.08  --  15.53*   HEMOGLOBIN 13.0 12.8* 13.7  --  17.8*   HEMATOCRIT 38.1 38.4 38.8  --  51.0   PLATELETS 203 178 185  --  317   NEUTROS ABS  --   --  8.17*  --  13.31*   IMMATURE GRANS (ABS)  --   --  0.03  --  0.12*   LYMPHS ABS  --   --  0.97  --  0.95   MONOS ABS  --   --  0.67  --  1.08*   EOS ABS  --   --  0.19  --  0.01   .1*  100.3* 96.3  --  95.9   LACTATE  --   --   --  1.2 4.4*         Lab 04/02/23  0525 04/01/23  1548 04/01/23  0551 03/31/23  0348 03/30/23  0829   SODIUM 139  --  135* 136 131*   POTASSIUM 3.9 4.1 3.2* 3.7 3.7   CHLORIDE 106  --  99 99 90*   CO2 25.0  --  26.0 25.0 19.0*   ANION GAP 8.0  --  10.0 12.0 22.0*   BUN 2*  --  4* 5* 6   CREATININE 0.65*  --  0.62* 0.73* 0.86   EGFR 128.4  --  130.2 124.0 118.0   GLUCOSE 109*  --  80 84 145*   CALCIUM 7.9*  --  7.4* 8.1* 10.4   MAGNESIUM  --   --   --  1.3*  --    PHOSPHORUS  --   --   --  2.1*  --    HEMOGLOBIN A1C  --   --   --   --  5.30         Lab 04/02/23  0525 04/01/23  0551 03/31/23  0348 03/30/23  0829   TOTAL PROTEIN  --  4.9* 5.1* 7.4   ALBUMIN  --  3.0* 3.2* 4.7   GLOBULIN  --  1.9 1.9 2.7   ALT (SGPT)  --  15 10 20   AST (SGOT)  --  32 17 37   BILIRUBIN  --  0.9 1.0 1.5*   ALK PHOS  --  131* 82 123*   LIPASE 72* 183* 841* 656*             Lab 03/31/23  0348   CHOLESTEROL 150   LDL CHOL 73   HDL CHOL 58   TRIGLYCERIDES 103             Brief Urine Lab Results  (Last result in the past 365 days)      Color   Clarity   Blood   Leuk Est   Nitrite   Protein   CREAT   Urine HCG        03/30/23 0910 [place]   Cloudy   Trace   Negative   Positive   100 mg/dL (2+)                 Microbiology Results Abnormal     None          No radiology results from the last 24 hrs        Current medications:  Scheduled Meds:diazePAM, 10 mg, Oral, Q8H  enoxaparin, 40 mg, Subcutaneous, Nightly  hydrocortisone, 1 application, Topical, Q12H  senna-docusate sodium, 2 tablet, Oral, BID  sodium chloride, 10 mL, Intravenous, Q12H      Continuous Infusions:lactated ringers, 250 mL/hr, Last Rate: 250 mL/hr (04/02/23 1022)      PRN Meds:.•  acetaminophen **OR** acetaminophen **OR** acetaminophen  •  senna-docusate sodium **AND** polyethylene glycol **AND** bisacodyl **AND** bisacodyl  •  Calcium Replacement - Follow Nurse / BPA Driven Protocol  •  cloNIDine  •  HYDROmorphone  •  ketorolac  •   LORazepam **OR** LORazepam **OR** LORazepam **OR** LORazepam **OR** LORazepam **OR** LORazepam  •  Magnesium Standard Dose Replacement - Follow Nurse / BPA Driven Protocol  •  nicotine polacrilex  •  ondansetron **OR** ondansetron  •  oxyCODONE-acetaminophen  •  Phosphorus Replacement - Follow Nurse / BPA Driven Protocol  •  Potassium Replacement - Follow Nurse / BPA Driven Protocol  •  prochlorperazine  •  Sodium Chloride (PF)  •  sodium chloride  •  sodium chloride    Assessment & Plan   Assessment & Plan     Active Hospital Problems    Diagnosis  POA   • **Severe acute pancreatitis [K85.90]  Unknown   • Alcohol withdrawal [F10.939]  Yes   • Lactic acidosis [E87.20]  Unknown      Resolved Hospital Problems   No resolved problems to display.        Brief Hospital Course to date:  Robson Miller is a 32 y.o. male with hx of alcohol abuse presents due to abdominal pain. Found to have severe acute pancreatitis. GI has been consulted to assist with management.    Severe acute pancreatitis due to alcohol  Alcoholic ketoacidosis  Lactic acidosis  --CT A/P with severe acute pancreatitis noted with adjacent free fluid and mesenteric edema with layering fluid in the pelvis and free retroperitoneal fluid along the right paracolic gutter; no necrosis or pancreatic fluid collection noted; diffuse hepatic steatosis  --Lipid profile normal  --Continue aggressive IV fluids, monitor for signs/symptoms of volume overload   --Pain control with IV Dilaudid, Toradol, PO Percocet -- wean IV Dilaudid to Q4H PRN today  --Full liquid diet as tolerated  --GI following, appreciate their assistance, may need endoscopic feeding tube placed for nutrition  --Lipase improved 841-->72  --At high risk for clinical decompensation; he is having low grade fevers now, persistently tachycardic, monitor very closely - check CBC in AM; low threshold to repeat CT A/P     Hypomagnesemia  Hypokalemia  --Replacement protocol ordered    Alcohol  abuse  --Reports 10-12 drinks daily, bourbon or gin  --CIWA  --Vitamins  --Scheduled Valium -- will wean to 5 mg Q8H today  --PRN Ativan    Complex patient.     Total time spent: Time Spent: Time Spent: 50 minutes  Time spent includes time reviewing chart, face-to-face time, counseling patient/family/caregiver, ordering medications/tests/procedures, communicating with other health care professionals, documenting clinical information in the electronic health record, and coordination of care.       Expected Discharge Location and Transportation: home  Expected Discharge   Expected Discharge Date and Time     Expected Discharge Date Expected Discharge Time    Apr 3, 2023            DVT prophylaxis:  Medical DVT prophylaxis orders are present.          CODE STATUS:   Code Status and Medical Interventions:   Ordered at: 03/30/23 1416     Level Of Support Discussed With:    Patient     Code Status (Patient has no pulse and is not breathing):    CPR (Attempt to Resuscitate)     Medical Interventions (Patient has pulse or is breathing):    Full Support       Arielle Ramos MD  04/02/23

## 2023-04-02 NOTE — PLAN OF CARE
Goal Outcome Evaluation:     Did well eating. 100% of lunch & dinner  plus 2 boost breeze and plenty of water. ETOH withdrawal symptoms minimal

## 2023-04-02 NOTE — PLAN OF CARE
Goal Outcome Evaluation:  Plan of Care Reviewed With: patient           Outcome Evaluation: VSS, RA while awake 2LNC while sleeping, NSR-Sinus Tach on tele-monitor, IV PRN  pain medication given multiple times per request, good urine output. will cont. POC

## 2023-04-02 NOTE — PLAN OF CARE
Goal Outcome Evaluation:  Plan of Care Reviewed With: patient           Outcome Evaluation: VSS. Pt on RA and 2L while sleeping. PRN meds given for pain multiple timed. IVF continued. Pt ambulates well. No acute events on shift. Will continue plan of care.

## 2023-04-02 NOTE — PROGRESS NOTES
"GI Daily Progress Note  Subjective:    Chief Complaint: Follow-up severe pancreatitis    Robson is resting in bed in no acute distress.  Oral intake remains poor.  Disinterest in liquid foods and notes he did have pain with some soft/liquids earlier.  Patient still has abdominal pain and intermittent nausea/discomfort.    Objective:    /99 (BP Location: Left arm, Patient Position: Lying)   Pulse 105   Temp 98.3 °F (36.8 °C) (Axillary)   Resp 16   Ht 172.7 cm (68\")   Wt 74.8 kg (165 lb)   SpO2 97%   BMI 25.09 kg/m²     Physical Exam  Vitals and nursing note reviewed.   Constitutional:       General: He is not in acute distress.     Appearance: Normal appearance. He is normal weight. He is not ill-appearing or toxic-appearing.   Cardiovascular:      Rate and Rhythm: Normal rate and regular rhythm.      Pulses: Normal pulses.      Heart sounds: Normal heart sounds.   Pulmonary:      Effort: Pulmonary effort is normal. No respiratory distress.      Breath sounds: Normal breath sounds.   Abdominal:      General: Abdomen is flat. Bowel sounds are normal. There is distension.      Palpations: Abdomen is soft. There is no mass.      Tenderness: There is abdominal tenderness. There is no guarding or rebound.      Hernia: No hernia is present.   Skin:     General: Skin is warm and dry.      Capillary Refill: Capillary refill takes less than 2 seconds.      Coloration: Skin is pale. Skin is not jaundiced.   Neurological:      General: No focal deficit present.      Mental Status: He is alert and oriented to person, place, and time.   Psychiatric:         Mood and Affect: Mood normal.         Behavior: Behavior normal.         Thought Content: Thought content normal.         Judgment: Judgment normal.         Lab  I have personally reviewed most recent cardiac tracings, lab results and radiology images and interpretations and agree with findings.    Lab Results   Component Value Date    WBC 9.13 04/02/2023    HGB " 13.0 04/02/2023    HGB 12.8 (L) 04/01/2023    HGB 13.7 03/31/2023    .1 (H) 04/02/2023     04/02/2023       Lab Results   Component Value Date    GLUCOSE 109 (H) 04/02/2023    BUN 2 (L) 04/02/2023    CREATININE 0.65 (L) 04/02/2023    EGFRIFNONA 97 11/20/2020    EGFRIFAFRI 118 11/20/2020    BCR 3.1 (L) 04/02/2023     04/02/2023    K 3.9 04/02/2023    CO2 25.0 04/02/2023    CALCIUM 7.9 (L) 04/02/2023    ALBUMIN 3.0 (L) 04/01/2023    ALKPHOS 131 (H) 04/01/2023    BILITOT 0.9 04/01/2023    ALT 15 04/01/2023    AST 32 04/01/2023       Assessment:      Severe acute pancreatitis    Alcohol withdrawal    Lactic acidosis    1. Severe acute alcohol pancreatitis  2. Alcohol abuse and dependence   3. Lactic acidosis, related to above  4. Electrolyte dyscrasia, related to above    Plan:  Patient with ongoing issues related to poor appetite and pain.  Did have some fevers today of concern.  >>> Continue to encourage incentive spirometry; out of bed as tolerated.  >>> Continue antiemetics and pain control measures patient may ultimately require temporary nasojejunal feeding tube for nutrition as he has made little progress with p.o. intake.   -Discussed the importance of good nutrition with patient   -Discussed that this would be a temporary means of nutrition.  >>> If worsening febrile state and/or worsening leukocytosis, recommend stat CT abdomen pelvis with contrast.  >>> Continue IV fluids.    Xavier Negro, ANAID  04/02/23  15:44 EDT

## 2023-04-03 LAB
ALBUMIN SERPL-MCNC: 3.2 G/DL (ref 3.5–5.2)
ALBUMIN/GLOB SERPL: 1 G/DL
ALP SERPL-CCNC: 209 U/L (ref 39–117)
ALT SERPL W P-5'-P-CCNC: 17 U/L (ref 1–41)
ANION GAP SERPL CALCULATED.3IONS-SCNC: 13 MMOL/L (ref 5–15)
AST SERPL-CCNC: 42 U/L (ref 1–40)
BILIRUB SERPL-MCNC: 0.8 MG/DL (ref 0–1.2)
BUN SERPL-MCNC: <2 MG/DL (ref 6–20)
BUN/CREAT SERPL: ABNORMAL
CALCIUM SPEC-SCNC: 8.5 MG/DL (ref 8.6–10.5)
CHLORIDE SERPL-SCNC: 100 MMOL/L (ref 98–107)
CO2 SERPL-SCNC: 24 MMOL/L (ref 22–29)
CREAT SERPL-MCNC: 0.59 MG/DL (ref 0.76–1.27)
DEPRECATED RDW RBC AUTO: 50.8 FL (ref 37–54)
EGFRCR SERPLBLD CKD-EPI 2021: 132.2 ML/MIN/1.73
ERYTHROCYTE [DISTWIDTH] IN BLOOD BY AUTOMATED COUNT: 13.8 % (ref 12.3–15.4)
GLOBULIN UR ELPH-MCNC: 3.2 GM/DL
GLUCOSE SERPL-MCNC: 121 MG/DL (ref 65–99)
HCT VFR BLD AUTO: 38.1 % (ref 37.5–51)
HGB BLD-MCNC: 12.9 G/DL (ref 13–17.7)
LIPASE SERPL-CCNC: 97 U/L (ref 13–60)
MCH RBC QN AUTO: 33.9 PG (ref 26.6–33)
MCHC RBC AUTO-ENTMCNC: 33.9 G/DL (ref 31.5–35.7)
MCV RBC AUTO: 100.3 FL (ref 79–97)
PLATELET # BLD AUTO: 305 10*3/MM3 (ref 140–450)
PMV BLD AUTO: 9.4 FL (ref 6–12)
POTASSIUM SERPL-SCNC: 3.8 MMOL/L (ref 3.5–5.2)
PROT SERPL-MCNC: 6.4 G/DL (ref 6–8.5)
RBC # BLD AUTO: 3.8 10*6/MM3 (ref 4.14–5.8)
SODIUM SERPL-SCNC: 137 MMOL/L (ref 136–145)
WBC NRBC COR # BLD: 9.88 10*3/MM3 (ref 3.4–10.8)

## 2023-04-03 PROCEDURE — 83690 ASSAY OF LIPASE: CPT | Performed by: HOSPITALIST

## 2023-04-03 PROCEDURE — 25010000002 HYDROMORPHONE PER 4 MG: Performed by: HOSPITALIST

## 2023-04-03 PROCEDURE — 99232 SBSQ HOSP IP/OBS MODERATE 35: CPT | Performed by: HOSPITALIST

## 2023-04-03 PROCEDURE — 25010000002 ONDANSETRON PER 1 MG: Performed by: FAMILY MEDICINE

## 2023-04-03 PROCEDURE — 80053 COMPREHEN METABOLIC PANEL: CPT | Performed by: HOSPITALIST

## 2023-04-03 PROCEDURE — 25010000002 KETOROLAC TROMETHAMINE PER 15 MG: Performed by: FAMILY MEDICINE

## 2023-04-03 PROCEDURE — 25010000002 ENOXAPARIN PER 10 MG: Performed by: FAMILY MEDICINE

## 2023-04-03 PROCEDURE — 85027 COMPLETE CBC AUTOMATED: CPT | Performed by: HOSPITALIST

## 2023-04-03 RX ORDER — DIAZEPAM 5 MG/1
5 TABLET ORAL 2 TIMES DAILY
Status: COMPLETED | OUTPATIENT
Start: 2023-04-03 | End: 2023-04-04

## 2023-04-03 RX ADMIN — ENOXAPARIN SODIUM 40 MG: 40 INJECTION SUBCUTANEOUS at 20:37

## 2023-04-03 RX ADMIN — OXYCODONE HYDROCHLORIDE AND ACETAMINOPHEN 1 TABLET: 7.5; 325 TABLET ORAL at 03:56

## 2023-04-03 RX ADMIN — SODIUM CHLORIDE, POTASSIUM CHLORIDE, SODIUM LACTATE AND CALCIUM CHLORIDE 150 ML/HR: 600; 310; 30; 20 INJECTION, SOLUTION INTRAVENOUS at 16:05

## 2023-04-03 RX ADMIN — SENNOSIDES AND DOCUSATE SODIUM 2 TABLET: 8.6; 5 TABLET ORAL at 08:20

## 2023-04-03 RX ADMIN — CLONIDINE HYDROCHLORIDE 0.1 MG: 0.1 TABLET ORAL at 16:30

## 2023-04-03 RX ADMIN — SODIUM CHLORIDE, POTASSIUM CHLORIDE, SODIUM LACTATE AND CALCIUM CHLORIDE 250 ML/HR: 600; 310; 30; 20 INJECTION, SOLUTION INTRAVENOUS at 04:30

## 2023-04-03 RX ADMIN — Medication 10 ML: at 20:38

## 2023-04-03 RX ADMIN — SENNOSIDES AND DOCUSATE SODIUM 2 TABLET: 8.6; 5 TABLET ORAL at 20:38

## 2023-04-03 RX ADMIN — SODIUM CHLORIDE, POTASSIUM CHLORIDE, SODIUM LACTATE AND CALCIUM CHLORIDE 150 ML/HR: 600; 310; 30; 20 INJECTION, SOLUTION INTRAVENOUS at 20:44

## 2023-04-03 RX ADMIN — KETOROLAC TROMETHAMINE 30 MG: 30 INJECTION, SOLUTION INTRAMUSCULAR; INTRAVENOUS at 16:21

## 2023-04-03 RX ADMIN — DIAZEPAM 5 MG: 5 TABLET ORAL at 21:46

## 2023-04-03 RX ADMIN — SODIUM CHLORIDE, POTASSIUM CHLORIDE, SODIUM LACTATE AND CALCIUM CHLORIDE 250 ML/HR: 600; 310; 30; 20 INJECTION, SOLUTION INTRAVENOUS at 08:20

## 2023-04-03 RX ADMIN — ONDANSETRON 4 MG: 2 INJECTION INTRAMUSCULAR; INTRAVENOUS at 13:55

## 2023-04-03 RX ADMIN — DIAZEPAM 5 MG: 5 TABLET ORAL at 03:16

## 2023-04-03 RX ADMIN — HYDROMORPHONE HYDROCHLORIDE 0.5 MG: 1 INJECTION, SOLUTION INTRAMUSCULAR; INTRAVENOUS; SUBCUTANEOUS at 18:25

## 2023-04-03 RX ADMIN — HYDROMORPHONE HYDROCHLORIDE 0.5 MG: 1 INJECTION, SOLUTION INTRAMUSCULAR; INTRAVENOUS; SUBCUTANEOUS at 13:23

## 2023-04-03 RX ADMIN — NICOTINE POLACRILEX 2 MG: 2 LOZENGE ORAL at 21:47

## 2023-04-03 RX ADMIN — HYDROMORPHONE HYDROCHLORIDE 0.5 MG: 1 INJECTION, SOLUTION INTRAMUSCULAR; INTRAVENOUS; SUBCUTANEOUS at 08:21

## 2023-04-03 RX ADMIN — HYDROCORTISONE 1 APPLICATION: 1 CREAM TOPICAL at 10:09

## 2023-04-03 RX ADMIN — Medication 10 ML: at 08:20

## 2023-04-03 RX ADMIN — OXYCODONE HYDROCHLORIDE AND ACETAMINOPHEN 1 TABLET: 7.5; 325 TABLET ORAL at 14:23

## 2023-04-03 RX ADMIN — DIAZEPAM 5 MG: 5 TABLET ORAL at 10:09

## 2023-04-03 RX ADMIN — OXYCODONE HYDROCHLORIDE AND ACETAMINOPHEN 1 TABLET: 7.5; 325 TABLET ORAL at 20:38

## 2023-04-03 RX ADMIN — SODIUM CHLORIDE, POTASSIUM CHLORIDE, SODIUM LACTATE AND CALCIUM CHLORIDE 250 ML/HR: 600; 310; 30; 20 INJECTION, SOLUTION INTRAVENOUS at 00:49

## 2023-04-03 RX ADMIN — HYDROMORPHONE HYDROCHLORIDE 0.5 MG: 1 INJECTION, SOLUTION INTRAMUSCULAR; INTRAVENOUS; SUBCUTANEOUS at 00:44

## 2023-04-03 RX ADMIN — HYDROCORTISONE 1 APPLICATION: 1 CREAM TOPICAL at 20:39

## 2023-04-03 RX ADMIN — NICOTINE POLACRILEX 2 MG: 2 LOZENGE ORAL at 08:58

## 2023-04-03 RX ADMIN — OXYCODONE HYDROCHLORIDE AND ACETAMINOPHEN 1 TABLET: 7.5; 325 TABLET ORAL at 10:08

## 2023-04-03 NOTE — PLAN OF CARE
Goal Outcome Evaluation:           Progress: no change   Pt on RA and sinus tach on tele. BP elevated late in shift, and prn clonidine given. Pain is controlled with prn meds. Will cont to monitor.

## 2023-04-03 NOTE — PROGRESS NOTES
[facility] Medicine Services  PROGRESS NOTE    Patient Name: Robson Miller  : 1990  MRN: [id number]    Date of Admission: 3/30/2023  Primary Care Physician: Claudette Ho MD    Subjective   Subjective     CC:  F/U abdominal pain    HPI:  Seen this morning, eating better but still complains of pain.    ROS:  Gen-no fevers, no chills  CV-no chest pain, no palpitations  Resp-no cough, no dyspnea  GI-no N/V/D, +abd pain       Objective   Objective     Vital Signs:   Temp:  [97.1 °F (36.2 °C)-100.5 °F (38.1 °C)] 99.1 °F (37.3 °C)  Heart Rate:  [110-143] 115  Resp:  [14-16] 16  BP: (130-158)/() 130/84  Flow (L/min):  [2] 2     Physical Exam:  Gen-no acute distress  HENT-NCAT, mucous membranes moist  CV-tachycardic, S1 S2 normal, no m/r/g  Resp-CTAB, no wheezes or rales  Abd-soft, mild TTP across entire upper abdomen, ND, +BS  Ext-no edema  Neuro-A&Ox3, no focal deficits  Skin-no rashes  Psych-appropriate mood      Results Reviewed:  LAB RESULTS:      Lab 23  0555 23  0525 23  0551 23  0930 23  1150 23  0829   WBC 9.88 9.13 9.30 10.08  --  15.53*   HEMOGLOBIN 12.9* 13.0 12.8* 13.7  --  17.8*   HEMATOCRIT 38.1 38.1 38.4 38.8  --  51.0   PLATELETS 305 203 178 185  --  317   NEUTROS ABS  --   --   --  8.17*  --  13.31*   IMMATURE GRANS (ABS)  --   --   --  0.03  --  0.12*   LYMPHS ABS  --   --   --  0.97  --  0.95   MONOS ABS  --   --   --  0.67  --  1.08*   EOS ABS  --   --   --  0.19  --  0.01   .3* 101.1* 100.3* 96.3  --  95.9   LACTATE  --   --   --   --  1.2 4.4*         Lab 23  0555 23  0525 23  1548 23  0551 23  0348 23  0829   SODIUM 137 139  --  135* 136 131*   POTASSIUM 3.8 3.9 4.1 3.2* 3.7 3.7   CHLORIDE 100 106  --  99 99 90*   CO2 24.0 25.0  --  26.0 25.0 19.0*   ANION GAP 13.0 8.0  --  10.0 12.0 22.0*   BUN <2* 2*  --  4* 5* 6   CREATININE 0.59* 0.65*  --  0.62* 0.73* 0.86   EGFR 132.2  128.4  --  130.2 124.0 118.0   GLUCOSE 121* 109*  --  80 84 145*   CALCIUM 8.5* 7.9*  --  7.4* 8.1* 10.4   MAGNESIUM  --   --   --   --  1.3*  --    PHOSPHORUS  --   --   --   --  2.1*  --    HEMOGLOBIN A1C  --   --   --   --   --  5.30         Lab 04/03/23  0555 04/02/23  0525 04/01/23  0551 03/31/23  0348 03/30/23  0829   TOTAL PROTEIN 6.4  --  4.9* 5.1* 7.4   ALBUMIN 3.2*  --  3.0* 3.2* 4.7   GLOBULIN 3.2  --  1.9 1.9 2.7   ALT (SGPT) 17  --  15 10 20   AST (SGOT) 42*  --  32 17 37   BILIRUBIN 0.8  --  0.9 1.0 1.5*   ALK PHOS 209*  --  131* 82 123*   LIPASE 97* 72* 183* 841* 656*             Lab 03/31/23  0348   CHOLESTEROL 150   LDL CHOL 73   HDL CHOL 58   TRIGLYCERIDES 103             Brief Urine Lab Results  (Last result in the past 365 days)      Color   Clarity   Blood   Leuk Est   Nitrite   Protein   CREAT   Urine HCG        03/30/23 0910 [place]   Cloudy   Trace   Negative   Positive   100 mg/dL (2+)                 Microbiology Results Abnormal     None          No radiology results from the last 24 hrs        Current medications:  Scheduled Meds:diazePAM, 5 mg, Oral, Q12H  enoxaparin, 40 mg, Subcutaneous, Nightly  hydrocortisone, 1 application, Topical, Q12H  senna-docusate sodium, 2 tablet, Oral, BID  sodium chloride, 10 mL, Intravenous, Q12H      Continuous Infusions:lactated ringers, 150 mL/hr, Last Rate: 150 mL/hr (04/03/23 1251)      PRN Meds:.•  acetaminophen **OR** acetaminophen **OR** acetaminophen  •  senna-docusate sodium **AND** polyethylene glycol **AND** bisacodyl **AND** bisacodyl  •  Calcium Replacement - Follow Nurse / BPA Driven Protocol  •  cloNIDine  •  HYDROmorphone  •  ketorolac  •  LORazepam **OR** LORazepam **OR** LORazepam **OR** LORazepam **OR** LORazepam **OR** LORazepam  •  Magnesium Standard Dose Replacement - Follow Nurse / BPA Driven Protocol  •  nicotine polacrilex  •  ondansetron **OR** ondansetron  •  oxyCODONE-acetaminophen  •  Phosphorus Replacement - Follow Nurse / BPA  Driven Protocol  •  Potassium Replacement - Follow Nurse / BPA Driven Protocol  •  prochlorperazine  •  Sodium Chloride (PF)  •  sodium chloride  •  sodium chloride    Assessment & Plan   Assessment & Plan     Active Hospital Problems    Diagnosis  POA   • **Severe acute pancreatitis [K85.90]  Unknown   • Alcohol withdrawal [F10.939]  Yes   • Lactic acidosis [E87.20]  Unknown      Resolved Hospital Problems   No resolved problems to display.        Brief Hospital Course to date:  Robson Miller is a 32 y.o. male with hx of alcohol abuse presents due to abdominal pain. Found to have severe acute pancreatitis. GI has been consulted to assist with management.    Severe acute pancreatitis due to alcohol  Alcoholic ketoacidosis  Lactic acidosis  --CT A/P with severe acute pancreatitis noted with adjacent free fluid and mesenteric edema with layering fluid in the pelvis and free retroperitoneal fluid along the right paracolic gutter; no necrosis or pancreatic fluid collection noted; diffuse hepatic steatosis  --Lipid profile normal  --Continue aggressive IV fluids (decrease rate today), monitor for signs/symptoms of volume overload   --Pain control with IV Dilaudid, Toradol, PO Percocet -- continue to wean down IV Dilaudid   --Advance to GI soft diet today  --GI following, appreciate their assistance, no need for feeding tube as patient is now having better PO intake  --Lipase improved 841-->97, repeat in AM  --At risk for clinical decompensation; he is having low grade fevers, persistently tachycardic, monitor very closely - low threshold to repeat CT A/P     Hypomagnesemia  Hypokalemia  --Replacement protocol ordered    Alcohol abuse  --Reports 10-12 drinks daily, bourbon or gin  --CIWA  --Vitamins  --Scheduled Valium -- will wean to 5 mg Q12H today  --PRN Ativan      Expected Discharge Location and Transportation: home  Expected Discharge   Expected Discharge Date and Time     Expected Discharge Date Expected Discharge  Time    Apr 5, 2023            DVT prophylaxis:  Medical DVT prophylaxis orders are present.          CODE STATUS:   Code Status and Medical Interventions:   Ordered at: 03/30/23 1416     Level Of Support Discussed With:    Patient     Code Status (Patient has no pulse and is not breathing):    CPR (Attempt to Resuscitate)     Medical Interventions (Patient has pulse or is breathing):    Full Support       Arielle Ramos MD  04/03/23

## 2023-04-03 NOTE — CASE MANAGEMENT/SOCIAL WORK
Continued Stay Note  [facility]     Patient Name: Robson Miller  MRN: [id number]  Today's Date: 4/3/2023    Admit Date: 3/30/2023    Plan: Home   Discharge Plan     Row Name 04/03/23 1007       Plan    Plan Home    Patient/Family in Agreement with Plan yes    Plan Comments Patient's plan is stillto return home at discharge.  No needs noted at this time.  CM will continue to follow.    Final Discharge Disposition Code 01 - home or self-care               Discharge Codes    No documentation.               Expected Discharge Date and Time     Expected Discharge Date Expected Discharge Time    Apr 4, 2023             Mahsa Ryan RN

## 2023-04-04 ENCOUNTER — APPOINTMENT (OUTPATIENT)
Dept: CT IMAGING | Facility: HOSPITAL | Age: 33
DRG: 439 | End: 2023-04-04
Payer: COMMERCIAL

## 2023-04-04 LAB
ANION GAP SERPL CALCULATED.3IONS-SCNC: 14 MMOL/L (ref 5–15)
BUN SERPL-MCNC: 4 MG/DL (ref 6–20)
BUN/CREAT SERPL: 6.8 (ref 7–25)
CALCIUM SPEC-SCNC: 9.1 MG/DL (ref 8.6–10.5)
CHLORIDE SERPL-SCNC: 101 MMOL/L (ref 98–107)
CO2 SERPL-SCNC: 23 MMOL/L (ref 22–29)
CREAT SERPL-MCNC: 0.59 MG/DL (ref 0.76–1.27)
DEPRECATED RDW RBC AUTO: 53.1 FL (ref 37–54)
EGFRCR SERPLBLD CKD-EPI 2021: 132.2 ML/MIN/1.73
ERYTHROCYTE [DISTWIDTH] IN BLOOD BY AUTOMATED COUNT: 13.8 % (ref 12.3–15.4)
GLUCOSE SERPL-MCNC: 109 MG/DL (ref 65–99)
HCT VFR BLD AUTO: 37.8 % (ref 37.5–51)
HGB BLD-MCNC: 12.3 G/DL (ref 13–17.7)
LIPASE SERPL-CCNC: 112 U/L (ref 13–60)
MAGNESIUM SERPL-MCNC: 2.3 MG/DL (ref 1.6–2.6)
MCH RBC QN AUTO: 33.4 PG (ref 26.6–33)
MCHC RBC AUTO-ENTMCNC: 32.5 G/DL (ref 31.5–35.7)
MCV RBC AUTO: 102.7 FL (ref 79–97)
PLATELET # BLD AUTO: 342 10*3/MM3 (ref 140–450)
PMV BLD AUTO: 9 FL (ref 6–12)
POTASSIUM SERPL-SCNC: 3 MMOL/L (ref 3.5–5.2)
POTASSIUM SERPL-SCNC: 4.1 MMOL/L (ref 3.5–5.2)
RBC # BLD AUTO: 3.68 10*6/MM3 (ref 4.14–5.8)
SODIUM SERPL-SCNC: 138 MMOL/L (ref 136–145)
WBC NRBC COR # BLD: 9.79 10*3/MM3 (ref 3.4–10.8)

## 2023-04-04 PROCEDURE — 83735 ASSAY OF MAGNESIUM: CPT | Performed by: NURSE PRACTITIONER

## 2023-04-04 PROCEDURE — 99232 SBSQ HOSP IP/OBS MODERATE 35: CPT | Performed by: HOSPITALIST

## 2023-04-04 PROCEDURE — 80048 BASIC METABOLIC PNL TOTAL CA: CPT | Performed by: HOSPITALIST

## 2023-04-04 PROCEDURE — 25010000002 ENOXAPARIN PER 10 MG: Performed by: FAMILY MEDICINE

## 2023-04-04 PROCEDURE — 25010000002 HYDROMORPHONE PER 4 MG: Performed by: HOSPITALIST

## 2023-04-04 PROCEDURE — 25010000002 KETOROLAC TROMETHAMINE PER 15 MG: Performed by: FAMILY MEDICINE

## 2023-04-04 PROCEDURE — 85027 COMPLETE CBC AUTOMATED: CPT | Performed by: HOSPITALIST

## 2023-04-04 PROCEDURE — 25510000001 IOPAMIDOL 61 % SOLUTION: Performed by: HOSPITALIST

## 2023-04-04 PROCEDURE — 99232 SBSQ HOSP IP/OBS MODERATE 35: CPT | Performed by: NURSE PRACTITIONER

## 2023-04-04 PROCEDURE — 84132 ASSAY OF SERUM POTASSIUM: CPT | Performed by: HOSPITALIST

## 2023-04-04 PROCEDURE — 74178 CT ABD&PLV WO CNTR FLWD CNTR: CPT

## 2023-04-04 PROCEDURE — 83690 ASSAY OF LIPASE: CPT | Performed by: HOSPITALIST

## 2023-04-04 PROCEDURE — 25010000002 ONDANSETRON PER 1 MG: Performed by: FAMILY MEDICINE

## 2023-04-04 RX ORDER — POTASSIUM CHLORIDE 750 MG/1
40 CAPSULE, EXTENDED RELEASE ORAL EVERY 4 HOURS
Status: COMPLETED | OUTPATIENT
Start: 2023-04-04 | End: 2023-04-04

## 2023-04-04 RX ORDER — DIAZEPAM 5 MG/1
5 TABLET ORAL DAILY
Status: DISCONTINUED | OUTPATIENT
Start: 2023-04-05 | End: 2023-04-05

## 2023-04-04 RX ADMIN — OXYCODONE HYDROCHLORIDE AND ACETAMINOPHEN 1 TABLET: 7.5; 325 TABLET ORAL at 04:18

## 2023-04-04 RX ADMIN — SENNOSIDES AND DOCUSATE SODIUM 2 TABLET: 8.6; 5 TABLET ORAL at 21:51

## 2023-04-04 RX ADMIN — HYDROCORTISONE 1 APPLICATION: 1 CREAM TOPICAL at 21:51

## 2023-04-04 RX ADMIN — OXYCODONE HYDROCHLORIDE AND ACETAMINOPHEN 1 TABLET: 7.5; 325 TABLET ORAL at 12:24

## 2023-04-04 RX ADMIN — ENOXAPARIN SODIUM 40 MG: 40 INJECTION SUBCUTANEOUS at 21:50

## 2023-04-04 RX ADMIN — DIAZEPAM 5 MG: 5 TABLET ORAL at 21:50

## 2023-04-04 RX ADMIN — ONDANSETRON 4 MG: 2 INJECTION INTRAMUSCULAR; INTRAVENOUS at 09:32

## 2023-04-04 RX ADMIN — Medication 10 ML: at 21:56

## 2023-04-04 RX ADMIN — MAGNESIUM HYDROXIDE 10 ML: 2400 SUSPENSION ORAL at 12:21

## 2023-04-04 RX ADMIN — HYDROMORPHONE HYDROCHLORIDE 0.5 MG: 1 INJECTION, SOLUTION INTRAMUSCULAR; INTRAVENOUS; SUBCUTANEOUS at 13:55

## 2023-04-04 RX ADMIN — OXYCODONE HYDROCHLORIDE AND ACETAMINOPHEN 1 TABLET: 7.5; 325 TABLET ORAL at 18:12

## 2023-04-04 RX ADMIN — POTASSIUM CHLORIDE 40 MEQ: 10 CAPSULE, COATED, EXTENDED RELEASE ORAL at 13:55

## 2023-04-04 RX ADMIN — HYDROCORTISONE 1 APPLICATION: 1 CREAM TOPICAL at 12:21

## 2023-04-04 RX ADMIN — CLONIDINE HYDROCHLORIDE 0.1 MG: 0.1 TABLET ORAL at 04:18

## 2023-04-04 RX ADMIN — KETOROLAC TROMETHAMINE 30 MG: 30 INJECTION, SOLUTION INTRAMUSCULAR; INTRAVENOUS at 03:47

## 2023-04-04 RX ADMIN — POTASSIUM CHLORIDE 40 MEQ: 10 CAPSULE, COATED, EXTENDED RELEASE ORAL at 21:51

## 2023-04-04 RX ADMIN — CLONIDINE HYDROCHLORIDE 0.1 MG: 0.1 TABLET ORAL at 18:14

## 2023-04-04 RX ADMIN — POTASSIUM CHLORIDE 40 MEQ: 10 CAPSULE, COATED, EXTENDED RELEASE ORAL at 18:12

## 2023-04-04 RX ADMIN — SODIUM CHLORIDE, POTASSIUM CHLORIDE, SODIUM LACTATE AND CALCIUM CHLORIDE 150 ML/HR: 600; 310; 30; 20 INJECTION, SOLUTION INTRAVENOUS at 03:44

## 2023-04-04 RX ADMIN — NICOTINE POLACRILEX 2 MG: 2 LOZENGE ORAL at 14:26

## 2023-04-04 RX ADMIN — Medication 10 ML: at 09:32

## 2023-04-04 RX ADMIN — SENNOSIDES AND DOCUSATE SODIUM 2 TABLET: 8.6; 5 TABLET ORAL at 09:32

## 2023-04-04 RX ADMIN — HYDROMORPHONE HYDROCHLORIDE 0.5 MG: 1 INJECTION, SOLUTION INTRAMUSCULAR; INTRAVENOUS; SUBCUTANEOUS at 09:32

## 2023-04-04 RX ADMIN — OXYCODONE HYDROCHLORIDE AND ACETAMINOPHEN 1 TABLET: 7.5; 325 TABLET ORAL at 22:34

## 2023-04-04 RX ADMIN — NICOTINE POLACRILEX 2 MG: 2 LOZENGE ORAL at 23:18

## 2023-04-04 RX ADMIN — IOPAMIDOL 90 ML: 612 INJECTION, SOLUTION INTRAVENOUS at 12:03

## 2023-04-04 RX ADMIN — HYDROMORPHONE HYDROCHLORIDE 0.5 MG: 1 INJECTION, SOLUTION INTRAMUSCULAR; INTRAVENOUS; SUBCUTANEOUS at 23:17

## 2023-04-04 RX ADMIN — HYDROMORPHONE HYDROCHLORIDE 0.5 MG: 1 INJECTION, SOLUTION INTRAMUSCULAR; INTRAVENOUS; SUBCUTANEOUS at 00:44

## 2023-04-04 RX ADMIN — DIAZEPAM 5 MG: 5 TABLET ORAL at 09:32

## 2023-04-04 NOTE — PROGRESS NOTES
[facility] Medicine Services  PROGRESS NOTE    Patient Name: Robson Miller  : 1990  MRN: [id number]    Date of Admission: 3/30/2023  Primary Care Physician: Claudette Ho MD    Subjective   Subjective     CC:  F/U abdominal pain    HPI:  Seen this morning, still with pain.     ROS:  Gen-no fevers, no chills  CV-no chest pain, no palpitations  Resp-no cough, no dyspnea  GI-no N/V/D, +abd pain       Objective   Objective     Vital Signs:   Temp:  [98.3 °F (36.8 °C)-99.9 °F (37.7 °C)] 99.2 °F (37.3 °C)  Heart Rate:  [103-120] 103  Resp:  [14-16] 14  BP: (127-168)/() 127/81  Flow (L/min):  [2] 2     Physical Exam:  Gen-no acute distress  HENT-NCAT, mucous membranes moist  CV-tachycardia improved, S1 S2 normal, no m/r/g  Resp-CTAB, no wheezes or rales  Abd-soft, mild TTP across entire upper abdomen, ND, +BS  Ext-no edema  Neuro-A&Ox3, no focal deficits  Skin-no rashes  Psych-appropriate mood  No change from 4/3/23      Results Reviewed:  LAB RESULTS:      Lab 23  0514 23  0555 23  0525 23  0551 23  0930 23  1150 23  0829   WBC 9.79 9.88 9.13 9.30 10.08  --  15.53*   HEMOGLOBIN 12.3* 12.9* 13.0 12.8* 13.7  --  17.8*   HEMATOCRIT 37.8 38.1 38.1 38.4 38.8  --  51.0   PLATELETS 342 305 203 178 185  --  317   NEUTROS ABS  --   --   --   --  8.17*  --  13.31*   IMMATURE GRANS (ABS)  --   --   --   --  0.03  --  0.12*   LYMPHS ABS  --   --   --   --  0.97  --  0.95   MONOS ABS  --   --   --   --  0.67  --  1.08*   EOS ABS  --   --   --   --  0.19  --  0.01   .7* 100.3* 101.1* 100.3* 96.3  --  95.9   LACTATE  --   --   --   --   --  1.2 4.4*         Lab 23  0514 23  0555 23  0525 23  1548 23  0551 23  0348 23  0829   SODIUM 138 137 139  --  135* 136 131*   POTASSIUM 3.0* 3.8 3.9 4.1 3.2* 3.7 3.7   CHLORIDE 101 100 106  --  99 99 90*   CO2 23.0 24.0 25.0  --  26.0 25.0 19.0*   ANION GAP 14.0  13.0 8.0  --  10.0 12.0 22.0*   BUN 4* <2* 2*  --  4* 5* 6   CREATININE 0.59* 0.59* 0.65*  --  0.62* 0.73* 0.86   EGFR 132.2 132.2 128.4  --  130.2 124.0 118.0   GLUCOSE 109* 121* 109*  --  80 84 145*   CALCIUM 9.1 8.5* 7.9*  --  7.4* 8.1* 10.4   MAGNESIUM 2.3  --   --   --   --  1.3*  --    PHOSPHORUS  --   --   --   --   --  2.1*  --    HEMOGLOBIN A1C  --   --   --   --   --   --  5.30         Lab 04/04/23  0514 04/03/23  0555 04/02/23  0525 04/01/23  0551 03/31/23  0348 03/30/23  0829   TOTAL PROTEIN  --  6.4  --  4.9* 5.1* 7.4   ALBUMIN  --  3.2*  --  3.0* 3.2* 4.7   GLOBULIN  --  3.2  --  1.9 1.9 2.7   ALT (SGPT)  --  17  --  15 10 20   AST (SGOT)  --  42*  --  32 17 37   BILIRUBIN  --  0.8  --  0.9 1.0 1.5*   ALK PHOS  --  209*  --  131* 82 123*   LIPASE 112* 97* 72* 183* 841* 656*             Lab 03/31/23  0348   CHOLESTEROL 150   LDL CHOL 73   HDL CHOL 58   TRIGLYCERIDES 103             Brief Urine Lab Results  (Last result in the past 365 days)      Color   Clarity   Blood   Leuk Est   Nitrite   Protein   CREAT   Urine HCG        03/30/23 0910 [place]   Cloudy   Trace   Negative   Positive   100 mg/dL (2+)                 Microbiology Results Abnormal     None          CT Abdomen Pelvis With & Without Contrast    Result Date: 4/4/2023  CT ABDOMEN PELVIS W WO CONTRAST Date of Exam: 4/4/2023 11:54 AM EDT Indication: pancreatitis, persistent symptoms and fevers.. Comparison: CT abdomen and pelvis 3/30/2023 Technique: Axial CT images were obtained of the abdomen and pelvis before and after the uneventful intravenous administration of 90 mL Isovue-300. Sagittal and coronal reconstructions were performed.  Automated exposure control and iterative reconstruction methods were used. Findings: There has been interval development of small bilateral pleural effusions with associated bibasilar atelectasis. Redemonstration of acute interstitial pancreatitis as evidenced by mildly swollen/edematous pancreas with  peripancreatic fat stranding and wispy linear nonorganized peripancreatic fluid. No pancreatic ductal dilatation. No hypoenhancing pancreatic parenchyma to suggest pancreatic necrosis. No evidence of pseudocyst. There is thin linear retroperitoneal fluid inferior to the pancreatic tail (series 3 image 61), similar to prior. No evidence of discrete pancreatic lesion. Pancreatic ductal anatomy is difficult to delineate. The adjacent splenic vein remains patent. Normal appearance of the liver, gallbladder, bile ducts, spleen, adrenal glands, kidneys, ureters, bladder, prostate, and seminal vesicles. No evidence of bowel obstruction or ileus. There is fluid and ingested material within the stomach. There is fluid  throughout the duodenum which otherwise appears unremarkable. There is some formed stool in the right colon with the left colon appearing largely decompressed. No pneumoperitoneum. Unremarkable appearance of the vasculature. The body wall soft tissues are normal. No lymphadenopathy. No acute or suspicious bony findings.     Impression: Impression: Similar appearance of acute interstitial pancreatitis. No evidence of pseudocyst. No pancreatic necrosis. Interval development of small bilateral pleural effusions. Electronically Signed: Jorje Hurtado  4/4/2023 12:58 PM EDT  Workstation ID: [id number]          Current medications:  Scheduled Meds:diazePAM, 5 mg, Oral, BID  enoxaparin, 40 mg, Subcutaneous, Nightly  hydrocortisone, 1 application, Topical, Q12H  potassium chloride, 40 mEq, Oral, Q4H  senna-docusate sodium, 2 tablet, Oral, BID  sodium chloride, 10 mL, Intravenous, Q12H      Continuous Infusions:   PRN Meds:.•  acetaminophen **OR** acetaminophen **OR** acetaminophen  •  senna-docusate sodium **AND** polyethylene glycol **AND** bisacodyl **AND** bisacodyl  •  Calcium Replacement - Follow Nurse / BPA Driven Protocol  •  cloNIDine  •  HYDROmorphone  •  ketorolac  •  LORazepam **OR** LORazepam **OR**  LORazepam **OR** LORazepam **OR** LORazepam **OR** LORazepam  •  Magnesium Standard Dose Replacement - Follow Nurse / BPA Driven Protocol  •  nicotine polacrilex  •  ondansetron **OR** ondansetron  •  oxyCODONE-acetaminophen  •  Phosphorus Replacement - Follow Nurse / BPA Driven Protocol  •  Potassium Replacement - Follow Nurse / BPA Driven Protocol  •  prochlorperazine  •  Sodium Chloride (PF)  •  sodium chloride  •  sodium chloride    Assessment & Plan   Assessment & Plan     Active Hospital Problems    Diagnosis  POA   • **Severe acute pancreatitis [K85.90]  Unknown   • Alcohol withdrawal [F10.939]  Yes   • Lactic acidosis [E87.20]  Unknown      Resolved Hospital Problems   No resolved problems to display.        Brief Hospital Course to date:  Robson Miller is a 32 y.o. male with hx of alcohol abuse presents due to abdominal pain. Found to have severe acute pancreatitis. GI has been consulted to assist with management.    Severe acute pancreatitis due to alcohol  Alcoholic ketoacidosis  Lactic acidosis  --Admission CT A/P with severe acute pancreatitis noted with adjacent free fluid and mesenteric edema with layering fluid in the pelvis and free retroperitoneal fluid along the right paracolic gutter; no necrosis or pancreatic fluid collection noted; diffuse hepatic steatosis  --Lipid profile normal  --s/p aggressive IV fluids, discontinued today 4/4/23  --Lipase overall improved 841-->72-->97-->112  --Backed off to full liquids again this morning due to pain  --GI following, appreciate their assistance, no need for feeding tube as patient is now having better PO intake; intermittent low grade fevers, slight uptrend in lipase, ongoing pain--repeat CT A/P done today and reviewed, shows similar appearance of pancreatitis without evidence of necrosis, interval development of small bilateral pleural effusions  --Pain control with IV Dilaudid, Toradol, PO Percocet -- continue to wean down IV Dilaudid      Hypomagnesemia  Hypokalemia  --Replacement protocol ordered    Alcohol abuse  --Reports 10-12 drinks daily, bourbon or gin  --CIWA  --Vitamins  --Scheduled Valium -- will wean to 5 mg once daily starting tomorrow and stop after two days  --PRN Ativan      Expected Discharge Location and Transportation: home  Expected Discharge possibly 1-2 days  Expected Discharge Date and Time     Expected Discharge Date Expected Discharge Time    Apr 5, 2023            DVT prophylaxis:  Medical DVT prophylaxis orders are present.          CODE STATUS:   Code Status and Medical Interventions:   Ordered at: 03/30/23 1416     Level Of Support Discussed With:    Patient     Code Status (Patient has no pulse and is not breathing):    CPR (Attempt to Resuscitate)     Medical Interventions (Patient has pulse or is breathing):    Full Support       Arielle Ramos MD  04/04/23

## 2023-04-04 NOTE — CASE MANAGEMENT/SOCIAL WORK
Continued Stay Note  [facility]     Patient Name: Robson Miller  MRN: [id number]  Today's Date: 4/4/2023    Admit Date: 3/30/2023    Plan: Ongoing   Discharge Plan     Row Name 04/04/23 1741       Plan    Plan Ongoing    Plan Comments Consult received- thank you.    Admission BAL= 30.  Pt reports that he is in recovery from OUD- initially refused any opioids- however, he is now receiving opioid pain control.  ETOH use- 10-12 bourbon or gin drinks daily.  CIWAs reviewed- highest score this hospital course= 6- today 1-3.  Admission labs- Lactate 4.4, Lipase 656.  Declines AUD treatment/resources.               Discharge Codes    No documentation.               Expected Discharge Date and Time     Expected Discharge Date Expected Discharge Time    Apr 5, 2023             Senia Whitney RN  MA,BSN-  Addiction Coordinator

## 2023-04-04 NOTE — PLAN OF CARE
Goal Outcome Evaluation:  Plan of Care Reviewed With: patient           Outcome Evaluation: BP elevated this morning. PRN 0.1 clonidine given. PRN pain meds given this shift. Pt c/o abdominal paint to flanks 7/10, 8/10. Pt independent in room. 2LNC when sleeping. Pt inquiring about stopping IVF. Continue POC.

## 2023-04-04 NOTE — PROGRESS NOTES
"GI Daily Progress Note  Subjective:    Chief Complaint:  F/u acute pancreatitis     Pt resting in bed in no acute distress.  Still with abdominal pain requiring frequent medication for control.  Has not had BM this admission. Has some nausea after breakfast. Temperature 98.6 this AM.     Objective:    /84   Pulse 107   Temp 98.7 °F (37.1 °C) (Oral)   Resp 14   Ht 172.7 cm (68\")   Wt 74.8 kg (165 lb)   SpO2 96%   BMI 25.09 kg/m²     Physical Exam  Vitals and nursing note reviewed.   Constitutional:       General: He is not in acute distress.     Appearance: Normal appearance. He is normal weight. He is not ill-appearing or toxic-appearing.   Cardiovascular:      Rate and Rhythm: Normal rate and regular rhythm.      Pulses: Normal pulses.      Heart sounds: Normal heart sounds.   Pulmonary:      Effort: Pulmonary effort is normal. No respiratory distress.      Breath sounds: Normal breath sounds.   Abdominal:      General: Abdomen is flat. Bowel sounds are normal. There is distension.      Palpations: Abdomen is soft. There is no mass.      Tenderness: There is abdominal tenderness. There is no guarding or rebound.      Hernia: No hernia is present.   Skin:     General: Skin is warm and dry.      Capillary Refill: Capillary refill takes less than 2 seconds.      Coloration: Skin is not jaundiced or pale.   Neurological:      General: No focal deficit present.      Mental Status: He is alert and oriented to person, place, and time.   Psychiatric:         Mood and Affect: Mood normal.         Thought Content: Thought content normal.         Judgment: Judgment normal.         Lab  I have personally reviewed most recent cardiac tracings, lab results and radiology images and interpretations and agree with findings.    Lab Results   Component Value Date    WBC 9.79 04/04/2023    HGB 12.3 (L) 04/04/2023    HGB 12.9 (L) 04/03/2023    HGB 13.0 04/02/2023    .7 (H) 04/04/2023     04/04/2023       Lab " Results   Component Value Date    GLUCOSE 109 (H) 04/04/2023    BUN 4 (L) 04/04/2023    CREATININE 0.59 (L) 04/04/2023    EGFRIFNONA 97 11/20/2020    EGFRIFAFRI 118 11/20/2020    BCR 6.8 (L) 04/04/2023     04/04/2023    K 3.0 (L) 04/04/2023    CO2 23.0 04/04/2023    CALCIUM 9.1 04/04/2023    ALBUMIN 3.2 (L) 04/03/2023    ALKPHOS 209 (H) 04/03/2023    BILITOT 0.8 04/03/2023    ALT 17 04/03/2023    AST 42 (H) 04/03/2023       Assessment:  1. Severe acute alcohol pancreatitis, improving  2. Alcohol abuse and dependence   3. Lactic acidosis, related to above  4. Electrolyte dyscrasia, related to above    Plan:  Patient with ongoing abdominal pain and intermittent nausea associated with meals. Patient has also not had a BM since prior to admission. Will rescan abdomen and purge bowels. Drop to full liquid diet today and re advance in AM    >>> CT Abdomen and Pelvis with contrast today given persistent pain, intermittent fevers.   >>> Milk of Mag x 1 today  >>> Continue Bowel Regimen; daily miralax.  If no BM by end of shift, give Dulcolax  >>> D/c IV Fluids; Creatinine and Renal function is stable.   >>> Continue antiemetics and pain control measures   -start to come off of IV control medications and transition to PO  >>> Given nausea and symptoms after breakfast, full liquids remainder of day.   >>> ICS while awake.    Xavier Negro, ANAID  04/04/23  09:57 EDT

## 2023-04-04 NOTE — PLAN OF CARE
Goal Outcome Evaluation:           Progress: no change   Pt on RA and sinus tach on tele. Still requesting pain medication regularly. Patient has had first of 4 doses of K replacement. Will cont to monitor.

## 2023-04-05 LAB
ALBUMIN SERPL-MCNC: 3.2 G/DL (ref 3.5–5.2)
ALBUMIN/GLOB SERPL: 1.1 G/DL
ALP SERPL-CCNC: 206 U/L (ref 39–117)
ALT SERPL W P-5'-P-CCNC: 24 U/L (ref 1–41)
ANION GAP SERPL CALCULATED.3IONS-SCNC: 14 MMOL/L (ref 5–15)
AST SERPL-CCNC: 49 U/L (ref 1–40)
BILIRUB SERPL-MCNC: 0.4 MG/DL (ref 0–1.2)
BUN SERPL-MCNC: 4 MG/DL (ref 6–20)
BUN/CREAT SERPL: 5.6 (ref 7–25)
CALCIUM SPEC-SCNC: 8.8 MG/DL (ref 8.6–10.5)
CHLORIDE SERPL-SCNC: 100 MMOL/L (ref 98–107)
CO2 SERPL-SCNC: 22 MMOL/L (ref 22–29)
CREAT SERPL-MCNC: 0.71 MG/DL (ref 0.76–1.27)
DEPRECATED RDW RBC AUTO: 53.1 FL (ref 37–54)
EGFRCR SERPLBLD CKD-EPI 2021: 125 ML/MIN/1.73
ERYTHROCYTE [DISTWIDTH] IN BLOOD BY AUTOMATED COUNT: 14.2 % (ref 12.3–15.4)
GLOBULIN UR ELPH-MCNC: 2.9 GM/DL
GLUCOSE SERPL-MCNC: 95 MG/DL (ref 65–99)
HCT VFR BLD AUTO: 40 % (ref 37.5–51)
HGB BLD-MCNC: 13.2 G/DL (ref 13–17.7)
LIPASE SERPL-CCNC: 147 U/L (ref 13–60)
MCH RBC QN AUTO: 33.4 PG (ref 26.6–33)
MCHC RBC AUTO-ENTMCNC: 33 G/DL (ref 31.5–35.7)
MCV RBC AUTO: 101.3 FL (ref 79–97)
PLATELET # BLD AUTO: 457 10*3/MM3 (ref 140–450)
PMV BLD AUTO: 9 FL (ref 6–12)
POTASSIUM SERPL-SCNC: 4.4 MMOL/L (ref 3.5–5.2)
PROT SERPL-MCNC: 6.1 G/DL (ref 6–8.5)
RBC # BLD AUTO: 3.95 10*6/MM3 (ref 4.14–5.8)
SODIUM SERPL-SCNC: 136 MMOL/L (ref 136–145)
WBC NRBC COR # BLD: 9.67 10*3/MM3 (ref 3.4–10.8)

## 2023-04-05 PROCEDURE — 80053 COMPREHEN METABOLIC PANEL: CPT | Performed by: HOSPITALIST

## 2023-04-05 PROCEDURE — 85027 COMPLETE CBC AUTOMATED: CPT | Performed by: HOSPITALIST

## 2023-04-05 PROCEDURE — 83690 ASSAY OF LIPASE: CPT | Performed by: HOSPITALIST

## 2023-04-05 PROCEDURE — 25010000002 METHYLNALTREXONE 12 MG/0.6ML SOLUTION: Performed by: PHYSICIAN ASSISTANT

## 2023-04-05 PROCEDURE — 99232 SBSQ HOSP IP/OBS MODERATE 35: CPT | Performed by: HOSPITALIST

## 2023-04-05 PROCEDURE — 25010000002 ENOXAPARIN PER 10 MG: Performed by: FAMILY MEDICINE

## 2023-04-05 PROCEDURE — 99232 SBSQ HOSP IP/OBS MODERATE 35: CPT | Performed by: PHYSICIAN ASSISTANT

## 2023-04-05 PROCEDURE — 25010000002 HYDROMORPHONE PER 4 MG: Performed by: HOSPITALIST

## 2023-04-05 RX ORDER — OXYCODONE AND ACETAMINOPHEN 10; 325 MG/1; MG/1
1 TABLET ORAL EVERY 4 HOURS PRN
Status: DISCONTINUED | OUTPATIENT
Start: 2023-04-05 | End: 2023-04-10

## 2023-04-05 RX ORDER — CLONIDINE HYDROCHLORIDE 0.1 MG/1
0.1 TABLET ORAL EVERY 6 HOURS PRN
Status: DISCONTINUED | OUTPATIENT
Start: 2023-04-05 | End: 2023-04-13 | Stop reason: HOSPADM

## 2023-04-05 RX ORDER — HYDROMORPHONE HYDROCHLORIDE 1 MG/ML
0.5 INJECTION, SOLUTION INTRAMUSCULAR; INTRAVENOUS; SUBCUTANEOUS EVERY 6 HOURS PRN
Status: DISPENSED | OUTPATIENT
Start: 2023-04-05 | End: 2023-04-11

## 2023-04-05 RX ADMIN — SENNOSIDES AND DOCUSATE SODIUM 2 TABLET: 8.6; 5 TABLET ORAL at 21:36

## 2023-04-05 RX ADMIN — NICOTINE POLACRILEX 2 MG: 2 LOZENGE ORAL at 11:17

## 2023-04-05 RX ADMIN — BISACODYL 10 MG: 10 SUPPOSITORY RECTAL at 21:36

## 2023-04-05 RX ADMIN — HYDROCORTISONE 1 APPLICATION: 1 CREAM TOPICAL at 09:10

## 2023-04-05 RX ADMIN — HYDROMORPHONE HYDROCHLORIDE 0.5 MG: 1 INJECTION, SOLUTION INTRAMUSCULAR; INTRAVENOUS; SUBCUTANEOUS at 10:29

## 2023-04-05 RX ADMIN — OXYCODONE HYDROCHLORIDE AND ACETAMINOPHEN 1 TABLET: 7.5; 325 TABLET ORAL at 02:37

## 2023-04-05 RX ADMIN — DIAZEPAM 5 MG: 5 TABLET ORAL at 09:10

## 2023-04-05 RX ADMIN — ENOXAPARIN SODIUM 40 MG: 40 INJECTION SUBCUTANEOUS at 21:35

## 2023-04-05 RX ADMIN — POLYETHYLENE GLYCOL 3350 17 G: 17 POWDER, FOR SOLUTION ORAL at 11:37

## 2023-04-05 RX ADMIN — SENNOSIDES AND DOCUSATE SODIUM 2 TABLET: 8.6; 5 TABLET ORAL at 09:10

## 2023-04-05 RX ADMIN — OXYCODONE HYDROCHLORIDE AND ACETAMINOPHEN 1 TABLET: 10; 325 TABLET ORAL at 15:53

## 2023-04-05 RX ADMIN — OXYCODONE HYDROCHLORIDE AND ACETAMINOPHEN 1 TABLET: 7.5; 325 TABLET ORAL at 09:10

## 2023-04-05 RX ADMIN — METHYLNALTREXONE BROMIDE 12 MG: 12 INJECTION, SOLUTION SUBCUTANEOUS at 16:05

## 2023-04-05 RX ADMIN — Medication 10 ML: at 21:35

## 2023-04-05 RX ADMIN — Medication 10 ML: at 09:10

## 2023-04-05 RX ADMIN — OXYCODONE HYDROCHLORIDE AND ACETAMINOPHEN 1 TABLET: 10; 325 TABLET ORAL at 23:20

## 2023-04-05 NOTE — PROGRESS NOTES
[facility] Medicine Services  PROGRESS NOTE    Patient Name: Robson Miller  : 1990  MRN: [id number]    Date of Admission: 3/30/2023  Primary Care Physician: Claudette Ho MD    Subjective   Subjective     CC:  F/U abdominal pain    HPI:  Seen this morning, no BM yet but having a lot of gas. He wants to rest right now but will take the Miralax later. Still with pain, says the Dilaudid is the only thing that helps, Percocet doesn't touch it. Tolerating full liquids.    ROS:  Gen-no fevers, no chills  CV-no chest pain, no palpitations  Resp-no cough, no dyspnea  GI-no N/V/D, +abd pain       Objective   Objective     Vital Signs:   Temp:  [97.7 °F (36.5 °C)-100 °F (37.8 °C)] 98.6 °F (37 °C)  Heart Rate:  [] 122  Resp:  [18] 18  BP: (128-149)/() 143/96  Flow (L/min):  [2] 2     Physical Exam:  Gen-no acute distress  HENT-NCAT, mucous membranes moist  CV-tachycardia improved, S1 S2 normal, no m/r/g  Resp-CTAB, no wheezes or rales  Abd-soft, very minimal TTP across entire upper abdomen, ND, +BS  Ext-no edema  Neuro-A&Ox3, no focal deficits  Skin-no rashes  Psych-appropriate mood      Results Reviewed:  LAB RESULTS:      Lab 23  0640 23  0514 23  0555 23  0525 23  0551 23  0930 23  1150 23  0829   WBC 9.67 9.79 9.88 9.13 9.30 10.08  --  15.53*   HEMOGLOBIN 13.2 12.3* 12.9* 13.0 12.8* 13.7  --  17.8*   HEMATOCRIT 40.0 37.8 38.1 38.1 38.4 38.8  --  51.0   PLATELETS 457* 342 305 203 178 185  --  317   NEUTROS ABS  --   --   --   --   --  8.17*  --  13.31*   IMMATURE GRANS (ABS)  --   --   --   --   --  0.03  --  0.12*   LYMPHS ABS  --   --   --   --   --  0.97  --  0.95   MONOS ABS  --   --   --   --   --  0.67  --  1.08*   EOS ABS  --   --   --   --   --  0.19  --  0.01   .3* 102.7* 100.3* 101.1* 100.3* 96.3  --  95.9   LACTATE  --   --   --   --   --   --  1.2 4.4*         Lab 23  0640 23  1931  04/04/23  0514 04/03/23  0555 04/02/23  0525 04/01/23  1548 04/01/23  0551 03/31/23  0348 03/30/23  0829   SODIUM 136  --  138 137 139  --  135* 136 131*   POTASSIUM 4.4 4.1 3.0* 3.8 3.9   < > 3.2* 3.7 3.7   CHLORIDE 100  --  101 100 106  --  99 99 90*   CO2 22.0  --  23.0 24.0 25.0  --  26.0 25.0 19.0*   ANION GAP 14.0  --  14.0 13.0 8.0  --  10.0 12.0 22.0*   BUN 4*  --  4* <2* 2*  --  4* 5* 6   CREATININE 0.71*  --  0.59* 0.59* 0.65*  --  0.62* 0.73* 0.86   EGFR 125.0  --  132.2 132.2 128.4  --  130.2 124.0 118.0   GLUCOSE 95  --  109* 121* 109*  --  80 84 145*   CALCIUM 8.8  --  9.1 8.5* 7.9*  --  7.4* 8.1* 10.4   MAGNESIUM  --   --  2.3  --   --   --   --  1.3*  --    PHOSPHORUS  --   --   --   --   --   --   --  2.1*  --    HEMOGLOBIN A1C  --   --   --   --   --   --   --   --  5.30    < > = values in this interval not displayed.         Lab 04/05/23  0640 04/04/23  0514 04/03/23  0555 04/02/23  0525 04/01/23  0551 03/31/23  0348 03/30/23  0829   TOTAL PROTEIN 6.1  --  6.4  --  4.9* 5.1* 7.4   ALBUMIN 3.2*  --  3.2*  --  3.0* 3.2* 4.7   GLOBULIN 2.9  --  3.2  --  1.9 1.9 2.7   ALT (SGPT) 24  --  17  --  15 10 20   AST (SGOT) 49*  --  42*  --  32 17 37   BILIRUBIN 0.4  --  0.8  --  0.9 1.0 1.5*   ALK PHOS 206*  --  209*  --  131* 82 123*   LIPASE 147* 112* 97* 72* 183* 841* 656*             Lab 03/31/23  0348   CHOLESTEROL 150   LDL CHOL 73   HDL CHOL 58   TRIGLYCERIDES 103             Brief Urine Lab Results  (Last result in the past 365 days)      Color   Clarity   Blood   Leuk Est   Nitrite   Protein   CREAT   Urine HCG        03/30/23 0910 [place]   Cloudy   Trace   Negative   Positive   100 mg/dL (2+)                 Microbiology Results Abnormal     None          CT Abdomen Pelvis With & Without Contrast    Result Date: 4/4/2023  CT ABDOMEN PELVIS W WO CONTRAST Date of Exam: 4/4/2023 11:54 AM EDT Indication: pancreatitis, persistent symptoms and fevers.. Comparison: CT abdomen and pelvis 3/30/2023  Technique: Axial CT images were obtained of the abdomen and pelvis before and after the uneventful intravenous administration of 90 mL Isovue-300. Sagittal and coronal reconstructions were performed.  Automated exposure control and iterative reconstruction methods were used. Findings: There has been interval development of small bilateral pleural effusions with associated bibasilar atelectasis. Redemonstration of acute interstitial pancreatitis as evidenced by mildly swollen/edematous pancreas with peripancreatic fat stranding and wispy linear nonorganized peripancreatic fluid. No pancreatic ductal dilatation. No hypoenhancing pancreatic parenchyma to suggest pancreatic necrosis. No evidence of pseudocyst. There is thin linear retroperitoneal fluid inferior to the pancreatic tail (series 3 image 61), similar to prior. No evidence of discrete pancreatic lesion. Pancreatic ductal anatomy is difficult to delineate. The adjacent splenic vein remains patent. Normal appearance of the liver, gallbladder, bile ducts, spleen, adrenal glands, kidneys, ureters, bladder, prostate, and seminal vesicles. No evidence of bowel obstruction or ileus. There is fluid and ingested material within the stomach. There is fluid  throughout the duodenum which otherwise appears unremarkable. There is some formed stool in the right colon with the left colon appearing largely decompressed. No pneumoperitoneum. Unremarkable appearance of the vasculature. The body wall soft tissues are normal. No lymphadenopathy. No acute or suspicious bony findings.     Impression: Impression: Similar appearance of acute interstitial pancreatitis. No evidence of pseudocyst. No pancreatic necrosis. Interval development of small bilateral pleural effusions. Electronically Signed: Jorje Hurtado  4/4/2023 12:58 PM EDT  Workstation ID: [id number]          Current medications:  Scheduled Meds:enoxaparin, 40 mg, Subcutaneous, Nightly  hydrocortisone, 1 application,  Topical, Q12H  methylnaltrexone, 12 mg, Subcutaneous, Once  senna-docusate sodium, 2 tablet, Oral, BID  sodium chloride, 10 mL, Intravenous, Q12H      Continuous Infusions:   PRN Meds:.•  acetaminophen **OR** acetaminophen **OR** acetaminophen  •  senna-docusate sodium **AND** polyethylene glycol **AND** bisacodyl **AND** bisacodyl  •  Calcium Replacement - Follow Nurse / BPA Driven Protocol  •  cloNIDine  •  HYDROmorphone  •  Magnesium Standard Dose Replacement - Follow Nurse / BPA Driven Protocol  •  nicotine polacrilex  •  ondansetron **OR** ondansetron  •  oxyCODONE-acetaminophen  •  Phosphorus Replacement - Follow Nurse / BPA Driven Protocol  •  Potassium Replacement - Follow Nurse / BPA Driven Protocol  •  prochlorperazine  •  Sodium Chloride (PF)  •  sodium chloride  •  sodium chloride    Assessment & Plan   Assessment & Plan     Active Hospital Problems    Diagnosis  POA   • **Severe acute pancreatitis [K85.90]  Unknown   • Alcohol withdrawal [F10.939]  Yes   • Lactic acidosis [E87.20]  Unknown      Resolved Hospital Problems   No resolved problems to display.        Brief Hospital Course to date:  Robson Miller is a 32 y.o. male with hx of alcohol abuse presents due to abdominal pain. Found to have severe acute pancreatitis. GI has been consulted to assist with management.    Severe acute pancreatitis due to alcohol  Alcoholic ketoacidosis  Lactic acidosis  --Admission CT A/P with severe acute pancreatitis noted with adjacent free fluid and mesenteric edema with layering fluid in the pelvis and free retroperitoneal fluid along the right paracolic gutter; no necrosis or pancreatic fluid collection noted; diffuse hepatic steatosis  --Lipid profile normal  --s/p aggressive IV fluids, discontinued 4/4/23  --Lipase overall improved 841-->72-->97-->112-->147 (seems to be trending back up slightly, however no changes on repeat CT scan)  --GI following, appreciate their assistance, no need for feeding tube as  patient is now having better PO intake; intermittent low grade fevers, slight uptrend in lipase, ongoing pain--repeat CT A/P done 4/4/23: shows similar appearance of pancreatitis without evidence of necrosis, interval development of small bilateral pleural effusions  --Full liquids, advance slowly as tolerated  --Pain control with IV Dilaudid, Toradol, Percocet -- continue to wean down IV Dilaudid today, will increase Percocet    Hypomagnesemia  Hypokalemia  --Replacement protocol ordered    Constipation  --May be contributing to his abdominal discomfort  --Bowel regimen  --GI ordering Relistor today    HTN  --No previous diagnosis, not on any home meds  --Likely multifactorial due to pain and alcohol withdrawal  --PRN Clonidine    Alcohol abuse  --Reports 10-12 drinks daily, bourbon or gin  --CIWA  --Vitamins  --Scheduled Valium tapered and will stop today 4/5/23  --PRN Ativan      Expected Discharge Location and Transportation: home  Expected Discharge possibly 1-2 days?  Expected Discharge Date and Time     Expected Discharge Date Expected Discharge Time    Apr 7, 2023            DVT prophylaxis:  Medical DVT prophylaxis orders are present.          CODE STATUS:   Code Status and Medical Interventions:   Ordered at: 03/30/23 1416     Level Of Support Discussed With:    Patient     Code Status (Patient has no pulse and is not breathing):    CPR (Attempt to Resuscitate)     Medical Interventions (Patient has pulse or is breathing):    Full Support       Arielle Ramos MD  04/05/23

## 2023-04-05 NOTE — PLAN OF CARE
Goal Outcome Evaluation:  Plan of Care Reviewed With: patient        Progress: improving  Outcome Evaluation: VSS and intermittently on 2LNC. SR-ST overnight. Pt took IV pain meds once and PO twice. Potassium now normal. Pt still has not had a BM. RN offered PRN bowel regiment meds but pt wanted to take them in the morning. Will continue POC

## 2023-04-05 NOTE — PLAN OF CARE
Goal Outcome Evaluation:  Plan of Care Reviewed With: patient        Progress: no change  VSS. Pt remains sinus tachy throughout the shift. Pain medication given PO x2 and IV Dilaudid given x1. Pt has not had a BM with current bowel regiment. Continue current POC.

## 2023-04-05 NOTE — CASE MANAGEMENT/SOCIAL WORK
Continued Stay Note  [facility]     Patient Name: Robson Miller  MRN: [id number]  Today's Date: 4/5/2023    Admit Date: 3/30/2023    Plan: Home   Discharge Plan     Row Name 04/05/23 0910       Plan    Plan Home    Patient/Family in Agreement with Plan yes    Plan Comments Patient's plan is still to return home at discharge.  No needs noted at this time.  CM will continue to follow.    Final Discharge Disposition Code 01 - home or self-care               Discharge Codes    No documentation.               Expected Discharge Date and Time     Expected Discharge Date Expected Discharge Time    Apr 6, 2023             Mahsa Ryan RN

## 2023-04-05 NOTE — PROGRESS NOTES
"GI Daily Progress Note  Subjective:    Pt laying in bed, awakens to answer questions. Persistent epigastric pain that radiates to his back. Passing flatus, no BM yesterday or today. Denies fever, chills, nausea, vomiting. Has backed off to clears in anticipation of BMs.     Objective:    /96 (BP Location: Right arm, Patient Position: Lying)   Pulse (!) 122   Temp 98.6 °F (37 °C) (Oral)   Resp 18   Ht 172.7 cm (68\")   Wt 74.8 kg (165 lb)   SpO2 98%   BMI 25.09 kg/m²     Physical Exam  Vitals and nursing note reviewed.   Constitutional:       General: He is not in acute distress.     Appearance: Normal appearance. He is not ill-appearing or diaphoretic.   HENT:      Head: Normocephalic and atraumatic.      Right Ear: External ear normal.      Left Ear: External ear normal.      Nose: Nose normal.      Mouth/Throat:      Mouth: Mucous membranes are moist.      Pharynx: Oropharynx is clear.   Eyes:      Conjunctiva/sclera: Conjunctivae normal.      Pupils: Pupils are equal, round, and reactive to light.   Cardiovascular:      Rate and Rhythm: Normal rate and regular rhythm.      Pulses: Normal pulses.      Heart sounds: Normal heart sounds.   Pulmonary:      Effort: Pulmonary effort is normal.      Breath sounds: Normal breath sounds.   Abdominal:      General: Abdomen is flat. There is no distension.      Tenderness: There is abdominal tenderness (epigastric). There is no guarding or rebound.   Musculoskeletal:         General: Normal range of motion.      Cervical back: Normal range of motion.   Skin:     General: Skin is warm and dry.      Coloration: Skin is not pale.   Neurological:      General: No focal deficit present.      Mental Status: He is alert and oriented to person, place, and time.   Psychiatric:         Mood and Affect: Mood normal.         Lab  Lab Results   Component Value Date    WBC 9.67 04/05/2023    HGB 13.2 04/05/2023    HGB 12.3 (L) 04/04/2023    HGB 12.9 (L) 04/03/2023    MCV " 101.3 (H) 04/05/2023     (H) 04/05/2023       Lab Results   Component Value Date    GLUCOSE 95 04/05/2023    BUN 4 (L) 04/05/2023    CREATININE 0.71 (L) 04/05/2023    EGFRIFNONA 97 11/20/2020    EGFRIFAFRI 118 11/20/2020    BCR 5.6 (L) 04/05/2023     04/05/2023    K 4.4 04/05/2023    CO2 22.0 04/05/2023    CALCIUM 8.8 04/05/2023    ALBUMIN 3.2 (L) 04/05/2023    ALKPHOS 206 (H) 04/05/2023    BILITOT 0.4 04/05/2023    ALT 24 04/05/2023    AST 49 (H) 04/05/2023       CT A/P w/wo contrast 4/4: Similar appearance of acute interstitial pancreatitis. No pseudocyst. No pancreatic necrosis. Interval development of small bilateral pleural effusions.     Assessment and Plan:    Severe acute EtOH pancreatitis  EtOH abuse and dependence  Constipation   - relistor 12mg x 1 ordered   - continue bowel regimen as ordered   - continue IVF, pain and nausea medications per hospitalist orders   - continue incentive spirometer q 1h while awake    Keyla Braun PA-C  04/05/23  15:11 EDT

## 2023-04-06 ENCOUNTER — APPOINTMENT (OUTPATIENT)
Dept: GENERAL RADIOLOGY | Facility: HOSPITAL | Age: 33
DRG: 439 | End: 2023-04-06
Payer: COMMERCIAL

## 2023-04-06 LAB
ALBUMIN SERPL-MCNC: 4 G/DL (ref 3.5–5.2)
ALBUMIN/GLOB SERPL: 1.1 G/DL
ALP SERPL-CCNC: 264 U/L (ref 39–117)
ALT SERPL W P-5'-P-CCNC: 43 U/L (ref 1–41)
ANION GAP SERPL CALCULATED.3IONS-SCNC: 16 MMOL/L (ref 5–15)
AST SERPL-CCNC: 66 U/L (ref 1–40)
BILIRUB SERPL-MCNC: 0.4 MG/DL (ref 0–1.2)
BUN SERPL-MCNC: 4 MG/DL (ref 6–20)
BUN/CREAT SERPL: 6 (ref 7–25)
CALCIUM SPEC-SCNC: 10 MG/DL (ref 8.6–10.5)
CHLORIDE SERPL-SCNC: 96 MMOL/L (ref 98–107)
CO2 SERPL-SCNC: 25 MMOL/L (ref 22–29)
CREAT SERPL-MCNC: 0.67 MG/DL (ref 0.76–1.27)
DEPRECATED RDW RBC AUTO: 53.5 FL (ref 37–54)
EGFRCR SERPLBLD CKD-EPI 2021: 127.2 ML/MIN/1.73
ERYTHROCYTE [DISTWIDTH] IN BLOOD BY AUTOMATED COUNT: 14.3 % (ref 12.3–15.4)
GLOBULIN UR ELPH-MCNC: 3.5 GM/DL
GLUCOSE SERPL-MCNC: 104 MG/DL (ref 65–99)
HCT VFR BLD AUTO: 43.8 % (ref 37.5–51)
HGB BLD-MCNC: 14.4 G/DL (ref 13–17.7)
LIPASE SERPL-CCNC: 121 U/L (ref 13–60)
MCH RBC QN AUTO: 33.1 PG (ref 26.6–33)
MCHC RBC AUTO-ENTMCNC: 32.9 G/DL (ref 31.5–35.7)
MCV RBC AUTO: 100.7 FL (ref 79–97)
PLATELET # BLD AUTO: 673 10*3/MM3 (ref 140–450)
PMV BLD AUTO: 9.1 FL (ref 6–12)
POTASSIUM SERPL-SCNC: 5.1 MMOL/L (ref 3.5–5.2)
PROT SERPL-MCNC: 7.5 G/DL (ref 6–8.5)
RBC # BLD AUTO: 4.35 10*6/MM3 (ref 4.14–5.8)
SODIUM SERPL-SCNC: 137 MMOL/L (ref 136–145)
WBC NRBC COR # BLD: 12.69 10*3/MM3 (ref 3.4–10.8)

## 2023-04-06 PROCEDURE — 74018 RADEX ABDOMEN 1 VIEW: CPT

## 2023-04-06 PROCEDURE — 25010000002 ENOXAPARIN PER 10 MG: Performed by: FAMILY MEDICINE

## 2023-04-06 PROCEDURE — 25010000002 ONDANSETRON PER 1 MG: Performed by: FAMILY MEDICINE

## 2023-04-06 PROCEDURE — 83690 ASSAY OF LIPASE: CPT | Performed by: HOSPITALIST

## 2023-04-06 PROCEDURE — 80053 COMPREHEN METABOLIC PANEL: CPT | Performed by: HOSPITALIST

## 2023-04-06 PROCEDURE — 25010000002 HYDROMORPHONE PER 4 MG: Performed by: HOSPITALIST

## 2023-04-06 PROCEDURE — 85027 COMPLETE CBC AUTOMATED: CPT | Performed by: HOSPITALIST

## 2023-04-06 PROCEDURE — 99233 SBSQ HOSP IP/OBS HIGH 50: CPT | Performed by: INTERNAL MEDICINE

## 2023-04-06 RX ORDER — POLYETHYLENE GLYCOL 3350 17 G/17G
17 POWDER, FOR SOLUTION ORAL 4 TIMES DAILY
Status: DISCONTINUED | OUTPATIENT
Start: 2023-04-06 | End: 2023-04-08

## 2023-04-06 RX ORDER — LORAZEPAM 1 MG/1
1 TABLET ORAL EVERY 6 HOURS PRN
Status: DISCONTINUED | OUTPATIENT
Start: 2023-04-06 | End: 2023-04-08

## 2023-04-06 RX ADMIN — POLYETHYLENE GLYCOL 3350 17 G: 17 POWDER, FOR SOLUTION ORAL at 20:02

## 2023-04-06 RX ADMIN — HYDROMORPHONE HYDROCHLORIDE 0.5 MG: 1 INJECTION, SOLUTION INTRAMUSCULAR; INTRAVENOUS; SUBCUTANEOUS at 11:43

## 2023-04-06 RX ADMIN — LORAZEPAM 1 MG: 1 TABLET ORAL at 15:44

## 2023-04-06 RX ADMIN — NICOTINE POLACRILEX 2 MG: 2 LOZENGE ORAL at 11:51

## 2023-04-06 RX ADMIN — POLYETHYLENE GLYCOL 3350 17 G: 17 POWDER, FOR SOLUTION ORAL at 17:02

## 2023-04-06 RX ADMIN — OXYCODONE HYDROCHLORIDE AND ACETAMINOPHEN 1 TABLET: 10; 325 TABLET ORAL at 22:40

## 2023-04-06 RX ADMIN — OXYCODONE HYDROCHLORIDE AND ACETAMINOPHEN 1 TABLET: 10; 325 TABLET ORAL at 09:25

## 2023-04-06 RX ADMIN — Medication 10 ML: at 20:03

## 2023-04-06 RX ADMIN — OXYCODONE HYDROCHLORIDE AND ACETAMINOPHEN 1 TABLET: 10; 325 TABLET ORAL at 18:30

## 2023-04-06 RX ADMIN — HYDROMORPHONE HYDROCHLORIDE 0.5 MG: 1 INJECTION, SOLUTION INTRAMUSCULAR; INTRAVENOUS; SUBCUTANEOUS at 02:11

## 2023-04-06 RX ADMIN — SENNOSIDES AND DOCUSATE SODIUM 2 TABLET: 8.6; 5 TABLET ORAL at 09:25

## 2023-04-06 RX ADMIN — BISACODYL 5 MG: 5 TABLET, COATED ORAL at 22:40

## 2023-04-06 RX ADMIN — ENOXAPARIN SODIUM 40 MG: 40 INJECTION SUBCUTANEOUS at 20:02

## 2023-04-06 RX ADMIN — MAGNESIUM HYDROXIDE 10 ML: 2400 SUSPENSION ORAL at 09:25

## 2023-04-06 RX ADMIN — SENNOSIDES AND DOCUSATE SODIUM 2 TABLET: 8.6; 5 TABLET ORAL at 20:02

## 2023-04-06 RX ADMIN — NICOTINE POLACRILEX 2 MG: 2 LOZENGE ORAL at 17:53

## 2023-04-06 RX ADMIN — OXYCODONE HYDROCHLORIDE AND ACETAMINOPHEN 1 TABLET: 10; 325 TABLET ORAL at 14:10

## 2023-04-06 RX ADMIN — ONDANSETRON 4 MG: 2 INJECTION INTRAMUSCULAR; INTRAVENOUS at 02:20

## 2023-04-06 RX ADMIN — HYDROMORPHONE HYDROCHLORIDE 0.5 MG: 1 INJECTION, SOLUTION INTRAMUSCULAR; INTRAVENOUS; SUBCUTANEOUS at 20:12

## 2023-04-06 RX ADMIN — Medication 10 ML: at 09:25

## 2023-04-06 RX ADMIN — HYDROCORTISONE 1 APPLICATION: 1 CREAM TOPICAL at 20:06

## 2023-04-06 NOTE — PLAN OF CARE
Goal Outcome Evaluation:  Plan of Care Reviewed With: patient        Progress: no change  Outcome Evaluation: VSS but remained tachy most of the shift. Pt complained of pain related to constipation and panc. IV and PO pain meds given once. Pt still has not had a BM after getting PRN suppository. RN notified JESSICA GEE and no new orders at this time. Will continue POC

## 2023-04-06 NOTE — PLAN OF CARE
Goal Outcome Evaluation:  Plan of Care Reviewed With: patient        Progress: no change  Outcome Evaluation: VSS. Pt is sinus tach on the monitor and had complaints of uncontrollable pain with pain medications. Ativan given x1 to help get pt's mind off of pain. Pain treated with IV Dilaudid x1 and Percocet x3. No BM with current regimen. New order for Miralax 4 times daily initiated. Will continue current POC.

## 2023-04-06 NOTE — PROGRESS NOTES
[facility] Medicine Services  PROGRESS NOTE    Patient Name: Robson Miller  : 1990  MRN: [id number]    Date of Admission: 3/30/2023  Primary Care Physician: Claudette Ho MD    Subjective   Subjective     CC:  F/U abdominal pain    HPI:   Miserable, pain is a 7 and Perc 10s bring it to a 6 for 2 hrs; Dilaudid gives better relief but just for an hr.  Still no BM.  Relistor caused acute sweats.  No history of opioid use at home.      ROS:  Gen-no fevers, no chills  CV-no chest pain, no palpitations  Resp-no cough, no dyspnea  GI- ongoing constip, minimal nausea, tolerates liquids     Objective   Objective     Vital Signs:   Temp:  [98 °F (36.7 °C)-100 °F (37.8 °C)] 98.3 °F (36.8 °C)  Heart Rate:  [104-133] 104  Resp:  [16-20] 18  BP: (132-154)/() 154/104  Flow (L/min):  [2] 2     Physical Exam:  Gen- mild distress but conversant   HENT-NCAT, mucous membranes moist  CV-tachycardia improved, S1 S2 normal, no m/r/g  Resp-CTAB, no wheezes or rales  Abd- tender upper and lower abdomen to mod palp   Ext-no edema  Neuro-A&Ox3, no focal deficits  Skin-no rashes  Psych-appropriate mood      Results Reviewed:  LAB RESULTS:      Lab 23  0640 23  0514 23  0555 23  0525 23  0551 23  0930 23  0930 23  1150   WBC 9.67 9.79 9.88 9.13 9.30   < > 10.08  --    HEMOGLOBIN 13.2 12.3* 12.9* 13.0 12.8*   < > 13.7  --    HEMATOCRIT 40.0 37.8 38.1 38.1 38.4   < > 38.8  --    PLATELETS 457* 342 305 203 178   < > 185  --    NEUTROS ABS  --   --   --   --   --   --  8.17*  --    IMMATURE GRANS (ABS)  --   --   --   --   --   --  0.03  --    LYMPHS ABS  --   --   --   --   --   --  0.97  --    MONOS ABS  --   --   --   --   --   --  0.67  --    EOS ABS  --   --   --   --   --   --  0.19  --    .3* 102.7* 100.3* 101.1* 100.3*   < > 96.3  --    LACTATE  --   --   --   --   --   --   --  1.2    < > = values in this interval not displayed.          Lab 04/05/23  0640 04/04/23  1931 04/04/23  0514 04/03/23  0555 04/02/23  0525 04/01/23  1548 04/01/23  0551 03/31/23  0348   SODIUM 136  --  138 137 139  --  135* 136   POTASSIUM 4.4 4.1 3.0* 3.8 3.9   < > 3.2* 3.7   CHLORIDE 100  --  101 100 106  --  99 99   CO2 22.0  --  23.0 24.0 25.0  --  26.0 25.0   ANION GAP 14.0  --  14.0 13.0 8.0  --  10.0 12.0   BUN 4*  --  4* <2* 2*  --  4* 5*   CREATININE 0.71*  --  0.59* 0.59* 0.65*  --  0.62* 0.73*   EGFR 125.0  --  132.2 132.2 128.4  --  130.2 124.0   GLUCOSE 95  --  109* 121* 109*  --  80 84   CALCIUM 8.8  --  9.1 8.5* 7.9*  --  7.4* 8.1*   MAGNESIUM  --   --  2.3  --   --   --   --  1.3*   PHOSPHORUS  --   --   --   --   --   --   --  2.1*    < > = values in this interval not displayed.         Lab 04/05/23  0640 04/04/23  0514 04/03/23  0555 04/02/23  0525 04/01/23  0551 03/31/23  0348   TOTAL PROTEIN 6.1  --  6.4  --  4.9* 5.1*   ALBUMIN 3.2*  --  3.2*  --  3.0* 3.2*   GLOBULIN 2.9  --  3.2  --  1.9 1.9   ALT (SGPT) 24  --  17  --  15 10   AST (SGOT) 49*  --  42*  --  32 17   BILIRUBIN 0.4  --  0.8  --  0.9 1.0   ALK PHOS 206*  --  209*  --  131* 82   LIPASE 147* 112* 97* 72* 183* 841*             Lab 03/31/23  0348   CHOLESTEROL 150   LDL CHOL 73   HDL CHOL 58   TRIGLYCERIDES 103             Brief Urine Lab Results  (Last result in the past 365 days)      Color   Clarity   Blood   Leuk Est   Nitrite   Protein   CREAT   Urine HCG        03/30/23 0910 [place]   Cloudy   Trace   Negative   Positive   100 mg/dL (2+)                 Microbiology Results Abnormal     None          CT Abdomen Pelvis With & Without Contrast    Result Date: 4/4/2023  CT ABDOMEN PELVIS W WO CONTRAST Date of Exam: 4/4/2023 11:54 AM EDT Indication: pancreatitis, persistent symptoms and fevers.. Comparison: CT abdomen and pelvis 3/30/2023 Technique: Axial CT images were obtained of the abdomen and pelvis before and after the uneventful intravenous administration of 90 mL Isovue-300. Sagittal  and coronal reconstructions were performed.  Automated exposure control and iterative reconstruction methods were used. Findings: There has been interval development of small bilateral pleural effusions with associated bibasilar atelectasis. Redemonstration of acute interstitial pancreatitis as evidenced by mildly swollen/edematous pancreas with peripancreatic fat stranding and wispy linear nonorganized peripancreatic fluid. No pancreatic ductal dilatation. No hypoenhancing pancreatic parenchyma to suggest pancreatic necrosis. No evidence of pseudocyst. There is thin linear retroperitoneal fluid inferior to the pancreatic tail (series 3 image 61), similar to prior. No evidence of discrete pancreatic lesion. Pancreatic ductal anatomy is difficult to delineate. The adjacent splenic vein remains patent. Normal appearance of the liver, gallbladder, bile ducts, spleen, adrenal glands, kidneys, ureters, bladder, prostate, and seminal vesicles. No evidence of bowel obstruction or ileus. There is fluid and ingested material within the stomach. There is fluid  throughout the duodenum which otherwise appears unremarkable. There is some formed stool in the right colon with the left colon appearing largely decompressed. No pneumoperitoneum. Unremarkable appearance of the vasculature. The body wall soft tissues are normal. No lymphadenopathy. No acute or suspicious bony findings.     Impression: Impression: Similar appearance of acute interstitial pancreatitis. No evidence of pseudocyst. No pancreatic necrosis. Interval development of small bilateral pleural effusions. Electronically Signed: Jorje Hutrado  4/4/2023 12:58 PM EDT  Workstation ID: [id number]          Current medications:  Scheduled Meds:enoxaparin, 40 mg, Subcutaneous, Nightly  hydrocortisone, 1 application, Topical, Q12H  magnesium hydroxide, 10 mL, Oral, Once  senna-docusate sodium, 2 tablet, Oral, BID  sodium chloride, 10 mL, Intravenous, Q12H      Continuous  Infusions:   PRN Meds:.•  acetaminophen **OR** acetaminophen **OR** acetaminophen  •  senna-docusate sodium **AND** polyethylene glycol **AND** bisacodyl **AND** bisacodyl  •  Calcium Replacement - Follow Nurse / BPA Driven Protocol  •  cloNIDine  •  HYDROmorphone  •  Magnesium Standard Dose Replacement - Follow Nurse / BPA Driven Protocol  •  nicotine polacrilex  •  ondansetron **OR** ondansetron  •  oxyCODONE-acetaminophen  •  Phosphorus Replacement - Follow Nurse / BPA Driven Protocol  •  Potassium Replacement - Follow Nurse / BPA Driven Protocol  •  prochlorperazine  •  Sodium Chloride (PF)  •  sodium chloride  •  sodium chloride    Assessment & Plan   Assessment & Plan     Active Hospital Problems    Diagnosis  POA   • **Severe acute pancreatitis [K85.90]  Unknown   • Alcohol withdrawal [F10.939]  Yes   • Lactic acidosis [E87.20]  Unknown      Resolved Hospital Problems   No resolved problems to display.        Brief Hospital Course to date:  Robson Miller is a 32 y.o. male with hx of alcohol abuse presents due to abdominal pain. Found to have severe acute pancreatitis. GI has been consulted to assist with management.    Severe acute pancreatitis due to alcohol  Alcoholic ketoacidosis  Lactic acidosis  --Admission CT A/P with severe acute pancreatitis noted with adjacent free fluid and mesenteric edema with layering fluid in the pelvis and free retroperitoneal fluid along the right paracolic gutter; no necrosis or pancreatic fluid collection noted; diffuse hepatic steatosis  --Lipid profile normal  --s/p aggressive IV fluids, discontinued 4/4/23  -- Lipase overall improved 841-->72-->97-->112-->147 (seems to be trending back up slightly, however no changes on repeat CT scan)  --GI following, appreciate their assistance, no need for feeding tube as patient is now having better PO intake; intermittent low grade fevers, slight uptrend in lipase, ongoing pain--repeat CT A/P done 4/4/23: shows similar appearance of  pancreatitis without evidence of necrosis, interval development of small bilateral pleural effusions  -- Full liquids, advance slowly as tolerated  - -Pain control:  Only one dose IV Dilaudid yest.  Perc 10 x2yest. However his pain is uncontrolled.  Adding ativan to help him deal with it.  Discussed the relationship between constip and opioids.  He is trying not to ask for pain but is having a bad day       Hypomagnesemia  Hypokalemia  --Replacement protocol ordered    Constipation  - diaphoresis with Relistor, doesn't want to retry  - miralax QID.  Declines enema     HTN  --No previous diagnosis, not on any home meds  --Likely multifactorial due to pain and alcohol withdrawal  --PRN Clonidine    Alcohol abuse  --Reports 10-12 drinks daily, bourbon or gin  --CIWA  --Vitamins  --Scheduled Valium tapered and will stop today 4/5/23  --PRN Ativan      Expected Discharge Location and Transportation: home  Expected Discharge possibly 1-2 days?  Expected Discharge Date and Time     Expected Discharge Date Expected Discharge Time    Apr 6, 2023            DVT prophylaxis:  Medical DVT prophylaxis orders are present.          CODE STATUS:   Code Status and Medical Interventions:   Ordered at: 03/30/23 1416     Level Of Support Discussed With:    Patient     Code Status (Patient has no pulse and is not breathing):    CPR (Attempt to Resuscitate)     Medical Interventions (Patient has pulse or is breathing):    Full Support       Margaret Hogue MD  04/06/23

## 2023-04-07 ENCOUNTER — APPOINTMENT (OUTPATIENT)
Dept: GENERAL RADIOLOGY | Facility: HOSPITAL | Age: 33
DRG: 439 | End: 2023-04-07
Payer: COMMERCIAL

## 2023-04-07 PROCEDURE — 99232 SBSQ HOSP IP/OBS MODERATE 35: CPT | Performed by: INTERNAL MEDICINE

## 2023-04-07 PROCEDURE — 25010000002 ENOXAPARIN PER 10 MG: Performed by: FAMILY MEDICINE

## 2023-04-07 PROCEDURE — 25010000002 HYDROMORPHONE PER 4 MG: Performed by: HOSPITALIST

## 2023-04-07 PROCEDURE — 74018 RADEX ABDOMEN 1 VIEW: CPT

## 2023-04-07 RX ORDER — SODIUM PHOSPHATE,MONO-DIBASIC 19G-7G/118
1 ENEMA (ML) RECTAL ONCE
Status: COMPLETED | OUTPATIENT
Start: 2023-04-07 | End: 2023-04-07

## 2023-04-07 RX ADMIN — SENNOSIDES AND DOCUSATE SODIUM 2 TABLET: 8.6; 5 TABLET ORAL at 21:46

## 2023-04-07 RX ADMIN — OXYCODONE HYDROCHLORIDE AND ACETAMINOPHEN 1 TABLET: 10; 325 TABLET ORAL at 23:01

## 2023-04-07 RX ADMIN — OXYCODONE HYDROCHLORIDE AND ACETAMINOPHEN 1 TABLET: 10; 325 TABLET ORAL at 16:24

## 2023-04-07 RX ADMIN — LORAZEPAM 1 MG: 1 TABLET ORAL at 23:50

## 2023-04-07 RX ADMIN — SENNOSIDES AND DOCUSATE SODIUM 2 TABLET: 8.6; 5 TABLET ORAL at 09:27

## 2023-04-07 RX ADMIN — NICOTINE POLACRILEX 2 MG: 2 LOZENGE ORAL at 22:26

## 2023-04-07 RX ADMIN — HYDROMORPHONE HYDROCHLORIDE 0.5 MG: 1 INJECTION, SOLUTION INTRAMUSCULAR; INTRAVENOUS; SUBCUTANEOUS at 14:40

## 2023-04-07 RX ADMIN — POLYETHYLENE GLYCOL 3350 17 G: 17 POWDER, FOR SOLUTION ORAL at 11:57

## 2023-04-07 RX ADMIN — HYDROCORTISONE 1 APPLICATION: 1 CREAM TOPICAL at 21:52

## 2023-04-07 RX ADMIN — LORAZEPAM 1 MG: 1 TABLET ORAL at 18:06

## 2023-04-07 RX ADMIN — POLYETHYLENE GLYCOL 3350 17 G: 17 POWDER, FOR SOLUTION ORAL at 18:06

## 2023-04-07 RX ADMIN — HYDROCORTISONE 1 APPLICATION: 1 CREAM TOPICAL at 09:00

## 2023-04-07 RX ADMIN — Medication 10 ML: at 21:47

## 2023-04-07 RX ADMIN — SODIUM PHOSPHATE 1 ENEMA: 7; 19 ENEMA RECTAL at 16:24

## 2023-04-07 RX ADMIN — OXYCODONE HYDROCHLORIDE AND ACETAMINOPHEN 1 TABLET: 10; 325 TABLET ORAL at 09:33

## 2023-04-07 RX ADMIN — LORAZEPAM 1 MG: 1 TABLET ORAL at 11:57

## 2023-04-07 RX ADMIN — ENOXAPARIN SODIUM 40 MG: 40 INJECTION SUBCUTANEOUS at 21:46

## 2023-04-07 RX ADMIN — NICOTINE POLACRILEX 2 MG: 2 LOZENGE ORAL at 18:28

## 2023-04-07 RX ADMIN — POLYETHYLENE GLYCOL 3350 17 G: 17 POWDER, FOR SOLUTION ORAL at 09:27

## 2023-04-07 RX ADMIN — POLYETHYLENE GLYCOL 3350 17 G: 17 POWDER, FOR SOLUTION ORAL at 21:46

## 2023-04-07 RX ADMIN — LORAZEPAM 1 MG: 1 TABLET ORAL at 00:00

## 2023-04-07 RX ADMIN — HYDROMORPHONE HYDROCHLORIDE 0.5 MG: 1 INJECTION, SOLUTION INTRAMUSCULAR; INTRAVENOUS; SUBCUTANEOUS at 21:46

## 2023-04-07 NOTE — PLAN OF CARE
Problem: Adult Inpatient Plan of Care  Goal: Plan of Care Review  Outcome: Ongoing, Progressing  Goal: Patient-Specific Goal (Individualized)  Outcome: Ongoing, Progressing  Goal: Absence of Hospital-Acquired Illness or Injury  Outcome: Ongoing, Progressing  Intervention: Identify and Manage Fall Risk  Recent Flowsheet Documentation  Taken 4/7/2023 1400 by Radha Gandhi RN  Safety Promotion/Fall Prevention: safety round/check completed  Taken 4/7/2023 1200 by Radha Gandhi RN  Safety Promotion/Fall Prevention: safety round/check completed  Taken 4/7/2023 1000 by Radha Gandhi RN  Safety Promotion/Fall Prevention: safety round/check completed  Taken 4/7/2023 0800 by Radha Gandhi RN  Safety Promotion/Fall Prevention: safety round/check completed  Intervention: Prevent Skin Injury  Recent Flowsheet Documentation  Taken 4/7/2023 1400 by Radha Gandhi RN  Body Position: position changed independently  Taken 4/7/2023 1200 by Radha Gandhi RN  Body Position: position changed independently  Taken 4/7/2023 1000 by Radha Gandhi RN  Body Position: position changed independently  Taken 4/7/2023 0800 by Radha Gandhi RN  Body Position: position changed independently  Skin Protection: incontinence pads utilized  Intervention: Prevent and Manage VTE (Venous Thromboembolism) Risk  Recent Flowsheet Documentation  Taken 4/7/2023 1400 by Radha Gandhi RN  Activity Management: up ad kaden  Taken 4/7/2023 1200 by Radha Gandhi RN  Activity Management: up ad kaden  Taken 4/7/2023 1000 by Radha Gandhi RN  Activity Management: up ad kaden  Taken 4/7/2023 0800 by Radha Gandhi RN  Activity Management: up ad kaden  VTE Prevention/Management: (lovenox) --  Range of Motion: active ROM (range of motion) encouraged  Intervention: Prevent Infection  Recent Flowsheet Documentation  Taken 4/7/2023 1400 by Radha Gandhi RN  Infection Prevention: hand hygiene promoted  Taken 4/7/2023 1200 by Radha Gandhi RN  Infection  Prevention: hand hygiene promoted  Taken 4/7/2023 1000 by Radha Gandhi RN  Infection Prevention: hand hygiene promoted  Taken 4/7/2023 0800 by Radha Gandhi RN  Infection Prevention:   hand hygiene promoted   rest/sleep promoted  Goal: Optimal Comfort and Wellbeing  Outcome: Ongoing, Progressing  Goal: Readiness for Transition of Care  Outcome: Ongoing, Progressing     Problem: Pain Acute  Goal: Acceptable Pain Control and Functional Ability  Outcome: Ongoing, Progressing  Intervention: Prevent or Manage Pain  Recent Flowsheet Documentation  Taken 4/7/2023 1400 by Radha Gandhi RN  Medication Review/Management: medications reviewed  Taken 4/7/2023 1000 by Radha Gandhi RN  Medication Review/Management: medications reviewed  Taken 4/7/2023 0800 by Radha Gandhi RN  Bowel Elimination Promotion:   adequate fluid intake promoted   ambulation promoted   diet adjusted   privacy promoted  Medication Review/Management: medications reviewed     Problem: Fluid Imbalance (Pancreatitis)  Goal: Fluid Balance  Outcome: Ongoing, Progressing     Problem: Infection (Pancreatitis)  Goal: Infection Symptom Resolution  Outcome: Ongoing, Progressing     Problem: Nutrition Impaired (Pancreatitis)  Goal: Optimal Nutrition Intake  Outcome: Ongoing, Progressing     Problem: Pain (Pancreatitis)  Goal: Acceptable Pain Control  Outcome: Ongoing, Progressing     Problem: Respiratory Compromise (Pancreatitis)  Goal: Effective Oxygenation and Ventilation  Outcome: Ongoing, Progressing  Intervention: Optimize Oxygenation and Ventilation  Recent Flowsheet Documentation  Taken 4/7/2023 1400 by Radha Gandhi RN  Activity Management: up ad kaden  Taken 4/7/2023 1200 by Radha Gandhi RN  Activity Management: up ad kaedn  Taken 4/7/2023 1000 by Radha Gandhi RN  Activity Management: up ad kaden  Taken 4/7/2023 0800 by Radha Gandhi RN  Activity Management: up ad kaden  Head of Bed (HOB) Positioning: HOB elevated  Cough And Deep Breathing:  done with encouragement   Goal Outcome Evaluation:

## 2023-04-07 NOTE — PROGRESS NOTES
"GI Daily Progress Note  Subjective     Robson Miller is a 32 y.o. male who was admitted with Severe acute pancreatitis.     Had a large BM today after enema.  Still complains of pain.  Tolerating liquids.  He is eating food even though he does not like the taste.  He is trying to get his calories in.    Chief Complaint: Abdominal pain    Objective     BP (!) 138/109 (BP Location: Left arm, Patient Position: Lying)   Pulse (!) 123   Temp 97.5 °F (36.4 °C) (Oral)   Resp 16   Ht 172.7 cm (68\")   Wt 74.8 kg (165 lb)   SpO2 95%   BMI 25.09 kg/m²     Intake/Output last 3 shifts:  I/O last 3 completed shifts:  In: 1050 [P.O.:1050]  Out: -   Intake/Output this shift:  No intake/output data recorded.      Physical Exam  Wt Readings from Last 3 Encounters:   03/30/23 74.8 kg (165 lb)   11/20/20 81.6 kg (180 lb)   ,body mass index is 25.09 kg/m².,@FLOWAMB(6)@,@FLOWAMB(5)@,@FLOWAMB(8)@   CONSTITUTIONAL: Lying in bed no distress  Resp CTA; no rhonchi, rales, or wheezes.  Respiration effort normal  CV RRR; no M/R/G. No lower extremity edema  GI Abd soft, diffusely tender, ND, normal active bowel sounds.    Psych: Awake alert oriented    DATA:  None today  Assessment & Plan     1.  Acute interstitial pancreatitis  2.  EtOH abuse and dependence  3.  Constipation  4.  Tobacco use  5.  Elevated LFTs   3.  Fatty liver        his tachycardia is worrisome.  His last CT scan 3 days ago did not show significant change.    Recheck CBC and CMP in a.m.  If white count is rising would recommend repeating CT scan.  Advised him to get out of bed and sit in a chair.  Continue diet.  Continue MiraLAX.  We will ultrasound gallbladder in a.m.      Severe acute pancreatitis    Alcohol withdrawal    Lactic acidosis       LOS: 8 days     Federico Mendez MD  04/07/23  19:02 EDT  "

## 2023-04-07 NOTE — PROGRESS NOTES
[facility] Medicine Services  PROGRESS NOTE    Patient Name: Robson Miller  : 1990  MRN: [id number]    Date of Admission: 3/30/2023  Primary Care Physician: Claudette Ho MD    Subjective   Subjective     CC:  F/U abdominal pain    HPI:   Still no BM.  Pain is unchanged.      ROS:  gen Intake of 1000ml yesterday     Objective   Objective     Vital Signs:   Temp:  [98 °F (36.7 °C)-98.6 °F (37 °C)] 98.6 °F (37 °C)  Heart Rate:  [111-137] 111  Resp:  [16-18] 16  BP: (127-148)/() 127/90     Physical Exam:  Gen- mild distress but conversant   HENT-NCAT, mucous membranes moist  CV-  Tachy RR,    Resp- nonlabored   Abd- tender upper and lower abdomen to mod palp , no change   Ext-no edema  Neuro-A&Ox3, no focal deficits  Skin-no rashes  Psych-appropriate mood      Results Reviewed:  LAB RESULTS:      Lab 23  0832 23  0640 23  0514 23  0555 23  0525   WBC 12.69* 9.67 9.79 9.88 9.13   HEMOGLOBIN 14.4 13.2 12.3* 12.9* 13.0   HEMATOCRIT 43.8 40.0 37.8 38.1 38.1   PLATELETS 673* 457* 342 305 203   .7* 101.3* 102.7* 100.3* 101.1*         Lab 23  0832 23  0640 23  1931 23  0514 23  0555 23  0525   SODIUM 137 136  --  138 137 139   POTASSIUM 5.1 4.4 4.1 3.0* 3.8 3.9   CHLORIDE 96* 100  --  101 100 106   CO2 25.0 22.0  --  23.0 24.0 25.0   ANION GAP 16.0* 14.0  --  14.0 13.0 8.0   BUN 4* 4*  --  4* <2* 2*   CREATININE 0.67* 0.71*  --  0.59* 0.59* 0.65*   EGFR 127.2 125.0  --  132.2 132.2 128.4   GLUCOSE 104* 95  --  109* 121* 109*   CALCIUM 10.0 8.8  --  9.1 8.5* 7.9*   MAGNESIUM  --   --   --  2.3  --   --          Lab 23  0832 23  0640 23  0514 23  0555 23  0525 23  0551   TOTAL PROTEIN 7.5 6.1  --  6.4  --  4.9*   ALBUMIN 4.0 3.2*  --  3.2*  --  3.0*   GLOBULIN 3.5 2.9  --  3.2  --  1.9   ALT (SGPT) 43* 24  --  17  --  15   AST (SGOT) 66* 49*  --  42*  --  32    BILIRUBIN 0.4 0.4  --  0.8  --  0.9   ALK PHOS 264* 206*  --  209*  --  131*   LIPASE 121* 147* 112* 97* 72* 183*                     Brief Urine Lab Results  (Last result in the past 365 days)      Color   Clarity   Blood   Leuk Est   Nitrite   Protein   CREAT   Urine HCG        03/30/23 0910 [place]   Cloudy   Trace   Negative   Positive   100 mg/dL (2+)                 Microbiology Results Abnormal     None          XR Abdomen KUB    Result Date: 4/6/2023  XR ABDOMEN KUB Date of Exam: 4/6/2023 9:38 AM EDT Indication: Constipation. Comparison: CT abdomen and pelvis 4/4/2023. Findings: No abnormal small bowel distention is seen, although there is a nonspecific paucity of gas filled small bowel loops. There is stool in the descending colon. No abnormal calcifications are seen.     Impression: Impression: Nonobstructive, nonspecific bowel gas pattern. Stool in the descending colon. Electronically Signed: Delfina Sherman  4/6/2023 10:04 AM EDT  Workstation ID: [id number]          Current medications:  Scheduled Meds:enoxaparin, 40 mg, Subcutaneous, Nightly  hydrocortisone, 1 application, Topical, Q12H  polyethylene glycol, 17 g, Oral, 4x Daily  senna-docusate sodium, 2 tablet, Oral, BID  sodium chloride, 10 mL, Intravenous, Q12H      Continuous Infusions:   PRN Meds:.•  acetaminophen **OR** acetaminophen **OR** acetaminophen  •  senna-docusate sodium **AND** polyethylene glycol **AND** bisacodyl **AND** bisacodyl  •  Calcium Replacement - Follow Nurse / BPA Driven Protocol  •  cloNIDine  •  HYDROmorphone  •  LORazepam  •  Magnesium Standard Dose Replacement - Follow Nurse / BPA Driven Protocol  •  nicotine polacrilex  •  ondansetron **OR** ondansetron  •  oxyCODONE-acetaminophen  •  Phosphorus Replacement - Follow Nurse / BPA Driven Protocol  •  Potassium Replacement - Follow Nurse / BPA Driven Protocol  •  prochlorperazine  •  sodium chloride  •  sodium chloride    Assessment & Plan   Assessment & Plan     Active  Hospital Problems    Diagnosis  POA   • **Severe acute pancreatitis [K85.90]  Unknown   • Alcohol withdrawal [F10.939]  Yes   • Lactic acidosis [E87.20]  Unknown      Resolved Hospital Problems   No resolved problems to display.        Brief Hospital Course to date:  Robson Miller is a 32 y.o. male with hx of alcohol abuse presents due to abdominal pain. Found to have severe acute pancreatitis. GI has been consulted to assist with management.    Severe acute pancreatitis due to alcohol  Alcoholic ketoacidosis  Lactic acidosis  --Admission CT A/P with severe acute pancreatitis noted with adjacent free fluid and mesenteric edema with layering fluid in the pelvis and free retroperitoneal fluid along the right paracolic gutter; no necrosis or pancreatic fluid collection noted; diffuse hepatic steatosis  - scan repeated 4/4:  No change   --Lipid profile normal  -- s/p aggressive IV fluids, discontinued 4/4/23  - advanced to solids but backed off to fulls due to increased pain 4/4.   -- GI following,  - -Pain control,    Hypomagnesemia  Hypokalemia  --Replacement protocol ordered    Constipation  - diaphoresis with Relistor, doesn't want to retry  - miralax QID.    - advised pt that he and nurse can decide re enema and suppository.    - hopefully will not need gastrgraffin enema.      HTN  --No previous diagnosis, not on any home meds  --Likely multifactorial due to pain and alcohol withdrawal  --PRN Clonidine    Alcohol abuse ad withdrawal   --Reports 10-12 drinks daily, bourbon or gin  --CIWA  --Vitamins  --Scheduled Valium tapered off but prn ativan placed for discomfort/pain   --PRN Ativan      Expected Discharge Location and Transportation: home  Expected Discharge possibly 1-2 days?  Expected Discharge Date and Time     Expected Discharge Date Expected Discharge Time    Apr 8, 2023            DVT prophylaxis:  Medical DVT prophylaxis orders are present.          CODE STATUS:   Code Status and Medical Interventions:    Ordered at: 03/30/23 1416     Level Of Support Discussed With:    Patient     Code Status (Patient has no pulse and is not breathing):    CPR (Attempt to Resuscitate)     Medical Interventions (Patient has pulse or is breathing):    Full Support       Margaret Hogue MD  04/07/23

## 2023-04-07 NOTE — PLAN OF CARE
Goal Outcome Evaluation:  Plan of Care Reviewed With: patient        Progress: no change  Outcome Evaluation: VSS but remained tachy throught out shift. Pt states he still has not had a BM despite increased bowel reg. Pt took IV dilaudid x1, percocet x1 and ativan x1. Will continue POC

## 2023-04-07 NOTE — CASE MANAGEMENT/SOCIAL WORK
Continued Stay Note  [facility]     Patient Name: Robson Miller  MRN: [id number]  Today's Date: 4/7/2023    Admit Date: 3/30/2023    Plan: Home   Discharge Plan     Row Name 04/07/23 1300       Plan    Plan Home    Plan Comments Patient's plan is still to return home at discharge.  He made need assistance with transportation.  CM will continue to follow.               Discharge Codes    No documentation.               Expected Discharge Date and Time     Expected Discharge Date Expected Discharge Time    Apr 8, 2023             Mahsa Ryan RN

## 2023-04-07 NOTE — NURSING NOTE
"After enema administration patient reported having a large bowel movement and says he \"feels so much better.\" will continue with plan of care  "

## 2023-04-07 NOTE — PROGRESS NOTES
"                    Clinical Nutrition       Patient Name: Robson Miller  YOB: 1990  MRN: [id number]  Date of Encounter: 04/07/23 14:05 EDT  Admission date: 3/30/2023      Reason for Visit   LOS    EMR Reviewed     EMR Reviewed: yes     Admission Diagnosis:  Alcohol withdrawal [F10.939]    Problem List:    Severe acute pancreatitis    Alcohol withdrawal    Lactic acidosis          Anthropometric      Flowsheet Rows    Flowsheet Row First Filed Value   Admission Height 172.7 cm (68\") Documented at 03/30/2023 0814   Admission Weight 74.8 kg (165 lb) Documented at 03/30/2023 0814            Height: 172.7 cm (68\")  Last Filed Weight: Weight: 74.8 kg (165 lb) (03/30/23 0814)   Method: ?  BMI: BMI (Calculated): 25.1  BMI classification: Overweight: 25.0-29.9kg/m2    IBW:  150lbs    UBW: 170lbs  Weight change:   Had a 5# loss in 5 months        Nutrition Focused Physical Exam     Date: 4/7    NFPE completed, patient does not meet criteria for MSA at this time.      Reported/Observed/Food/Nutrition Related - Comments     Pt denies pain with PO intake, however expressed concern for no solid BM. C/O of mild nausea without emesis and early satiety. Denies difficulty chewing/swallowing. NKFA.      Current Nutrition Prescription     Diet: Liquid Diets; Full Liquid; Texture: Regular Texture (IDDSI 7); Fluid Consistency: Thin (IDDSI 0)  No active supplement orders      Average Intake from Charting: Insufficient data     Nutrition Diagnosis     Problem Altered GI function   Etiology constipation   Signs/Symptoms No BM x5d   Status:    Actions     Follow treatment progress, Care plan reviewed, Encourage intake     Prune Juice to be mixed with Miralax to help promote BM.     Consider repeat suppository vs enema.     Monitor Per Protocol      Tennille Velasco, MS,RD,LD,   Time Spent: 25min        "

## 2023-04-08 ENCOUNTER — APPOINTMENT (OUTPATIENT)
Dept: ULTRASOUND IMAGING | Facility: HOSPITAL | Age: 33
DRG: 439 | End: 2023-04-08
Payer: COMMERCIAL

## 2023-04-08 LAB
ALBUMIN SERPL-MCNC: 3.8 G/DL (ref 3.5–5.2)
ALBUMIN/GLOB SERPL: 1.2 G/DL
ALP SERPL-CCNC: 254 U/L (ref 39–117)
ALT SERPL W P-5'-P-CCNC: 58 U/L (ref 1–41)
ANION GAP SERPL CALCULATED.3IONS-SCNC: 13 MMOL/L (ref 5–15)
AST SERPL-CCNC: 82 U/L (ref 1–40)
BASOPHILS # BLD AUTO: 0.16 10*3/MM3 (ref 0–0.2)
BASOPHILS NFR BLD AUTO: 1.3 % (ref 0–1.5)
BILIRUB SERPL-MCNC: 0.3 MG/DL (ref 0–1.2)
BUN SERPL-MCNC: 5 MG/DL (ref 6–20)
BUN/CREAT SERPL: 7.9 (ref 7–25)
CALCIUM SPEC-SCNC: 10.2 MG/DL (ref 8.6–10.5)
CHLORIDE SERPL-SCNC: 98 MMOL/L (ref 98–107)
CO2 SERPL-SCNC: 25 MMOL/L (ref 22–29)
CREAT SERPL-MCNC: 0.63 MG/DL (ref 0.76–1.27)
DEPRECATED RDW RBC AUTO: 53.6 FL (ref 37–54)
EGFRCR SERPLBLD CKD-EPI 2021: 129.6 ML/MIN/1.73
EOSINOPHIL # BLD AUTO: 0.37 10*3/MM3 (ref 0–0.4)
EOSINOPHIL NFR BLD AUTO: 3 % (ref 0.3–6.2)
ERYTHROCYTE [DISTWIDTH] IN BLOOD BY AUTOMATED COUNT: 14.6 % (ref 12.3–15.4)
GLOBULIN UR ELPH-MCNC: 3.3 GM/DL
GLUCOSE SERPL-MCNC: 116 MG/DL (ref 65–99)
HCT VFR BLD AUTO: 42.5 % (ref 37.5–51)
HGB BLD-MCNC: 14 G/DL (ref 13–17.7)
IMM GRANULOCYTES # BLD AUTO: 0.32 10*3/MM3 (ref 0–0.05)
IMM GRANULOCYTES NFR BLD AUTO: 2.6 % (ref 0–0.5)
LIPASE SERPL-CCNC: 104 U/L (ref 13–60)
LYMPHOCYTES # BLD AUTO: 2.08 10*3/MM3 (ref 0.7–3.1)
LYMPHOCYTES NFR BLD AUTO: 16.8 % (ref 19.6–45.3)
MCH RBC QN AUTO: 32.9 PG (ref 26.6–33)
MCHC RBC AUTO-ENTMCNC: 32.9 G/DL (ref 31.5–35.7)
MCV RBC AUTO: 100 FL (ref 79–97)
MONOCYTES # BLD AUTO: 1.35 10*3/MM3 (ref 0.1–0.9)
MONOCYTES NFR BLD AUTO: 10.9 % (ref 5–12)
NEUTROPHILS NFR BLD AUTO: 65.4 % (ref 42.7–76)
NEUTROPHILS NFR BLD AUTO: 8.07 10*3/MM3 (ref 1.7–7)
NRBC BLD AUTO-RTO: 0 /100 WBC (ref 0–0.2)
PLATELET # BLD AUTO: 766 10*3/MM3 (ref 140–450)
PMV BLD AUTO: 9.3 FL (ref 6–12)
POTASSIUM SERPL-SCNC: 4.8 MMOL/L (ref 3.5–5.2)
PROT SERPL-MCNC: 7.1 G/DL (ref 6–8.5)
RBC # BLD AUTO: 4.25 10*6/MM3 (ref 4.14–5.8)
SODIUM SERPL-SCNC: 136 MMOL/L (ref 136–145)
WBC NRBC COR # BLD: 12.35 10*3/MM3 (ref 3.4–10.8)

## 2023-04-08 PROCEDURE — 85025 COMPLETE CBC W/AUTO DIFF WBC: CPT | Performed by: INTERNAL MEDICINE

## 2023-04-08 PROCEDURE — 25010000002 HYDROMORPHONE PER 4 MG: Performed by: HOSPITALIST

## 2023-04-08 PROCEDURE — 76705 ECHO EXAM OF ABDOMEN: CPT

## 2023-04-08 PROCEDURE — 80053 COMPREHEN METABOLIC PANEL: CPT | Performed by: INTERNAL MEDICINE

## 2023-04-08 PROCEDURE — 25010000002 ENOXAPARIN PER 10 MG: Performed by: FAMILY MEDICINE

## 2023-04-08 PROCEDURE — 83690 ASSAY OF LIPASE: CPT | Performed by: INTERNAL MEDICINE

## 2023-04-08 PROCEDURE — 99233 SBSQ HOSP IP/OBS HIGH 50: CPT | Performed by: INTERNAL MEDICINE

## 2023-04-08 RX ORDER — POLYETHYLENE GLYCOL 3350 17 G/17G
17 POWDER, FOR SOLUTION ORAL 2 TIMES DAILY
Status: DISCONTINUED | OUTPATIENT
Start: 2023-04-08 | End: 2023-04-13 | Stop reason: HOSPADM

## 2023-04-08 RX ORDER — LORAZEPAM 0.5 MG/1
0.5 TABLET ORAL EVERY 6 HOURS PRN
Status: DISCONTINUED | OUTPATIENT
Start: 2023-04-08 | End: 2023-04-11

## 2023-04-08 RX ADMIN — OXYCODONE HYDROCHLORIDE AND ACETAMINOPHEN 1 TABLET: 10; 325 TABLET ORAL at 11:09

## 2023-04-08 RX ADMIN — Medication 10 ML: at 08:22

## 2023-04-08 RX ADMIN — Medication 10 ML: at 21:07

## 2023-04-08 RX ADMIN — HYDROCORTISONE 1 APPLICATION: 1 CREAM TOPICAL at 08:22

## 2023-04-08 RX ADMIN — SENNOSIDES AND DOCUSATE SODIUM 2 TABLET: 8.6; 5 TABLET ORAL at 11:08

## 2023-04-08 RX ADMIN — OXYCODONE HYDROCHLORIDE AND ACETAMINOPHEN 1 TABLET: 10; 325 TABLET ORAL at 15:34

## 2023-04-08 RX ADMIN — HYDROMORPHONE HYDROCHLORIDE 0.5 MG: 1 INJECTION, SOLUTION INTRAMUSCULAR; INTRAVENOUS; SUBCUTANEOUS at 08:22

## 2023-04-08 RX ADMIN — LORAZEPAM 0.5 MG: 0.5 TABLET ORAL at 22:34

## 2023-04-08 RX ADMIN — OXYCODONE HYDROCHLORIDE AND ACETAMINOPHEN 1 TABLET: 10; 325 TABLET ORAL at 21:08

## 2023-04-08 RX ADMIN — HYDROMORPHONE HYDROCHLORIDE 0.5 MG: 1 INJECTION, SOLUTION INTRAMUSCULAR; INTRAVENOUS; SUBCUTANEOUS at 14:47

## 2023-04-08 RX ADMIN — POLYETHYLENE GLYCOL 3350 17 G: 17 POWDER, FOR SOLUTION ORAL at 21:08

## 2023-04-08 RX ADMIN — NICOTINE POLACRILEX 2 MG: 2 LOZENGE ORAL at 19:26

## 2023-04-08 RX ADMIN — POLYETHYLENE GLYCOL 3350 17 G: 17 POWDER, FOR SOLUTION ORAL at 11:08

## 2023-04-08 RX ADMIN — ENOXAPARIN SODIUM 40 MG: 40 INJECTION SUBCUTANEOUS at 21:08

## 2023-04-08 RX ADMIN — HYDROCORTISONE 1 APPLICATION: 1 CREAM TOPICAL at 21:12

## 2023-04-08 RX ADMIN — LORAZEPAM 0.5 MG: 0.5 TABLET ORAL at 13:22

## 2023-04-08 RX ADMIN — CLONIDINE HYDROCHLORIDE 0.1 MG: 0.1 TABLET ORAL at 15:35

## 2023-04-08 RX ADMIN — SENNOSIDES AND DOCUSATE SODIUM 2 TABLET: 8.6; 5 TABLET ORAL at 21:08

## 2023-04-08 NOTE — PLAN OF CARE
Goal Outcome Evaluation:           Progress: no change   Pt still tachycardic with HR in low 100s and random elevated blood pressures. PRN clonidine given with some improvement. Still complaining of breakthrough pain between prn pain meds. Will cont to monitor.

## 2023-04-08 NOTE — PLAN OF CARE
Goal Outcome Evaluation:  Plan of Care Reviewed With: patient           RA. Strict NPO since midnight. VSS. NSt to sinus tachy on monitor. PRN percocet, dilaudid, and ativan given for pain. US gallerbladder scheduled for morning. Will continue plan of care.

## 2023-04-08 NOTE — PROGRESS NOTES
[facility] Medicine Services  PROGRESS NOTE    Patient Name: Robson Miller  : 1990  MRN: [id number]    Date of Admission: 3/30/2023  Primary Care Physician: Claudette Ho MD    Subjective   Subjective     CC:  F/U abdominal pain    HPI:  BM w enema yesterday.  Pain continues.  GB US done today .  Despite BM he feels pain about the same.     ROS:  gen Intake of 1000ml yesterday     Objective   Objective     Vital Signs:   Temp:  [97.3 °F (36.3 °C)-98.1 °F (36.7 °C)] 98 °F (36.7 °C)  Heart Rate:  [106-123] 106  Resp:  [16-18] 16  BP: (122-145)/() 122/86     Physical Exam:  Gen-  Conversant, NAD, father in room today   HENT- NCAT, mucous membranes moist  CV-  Tachy RR,  s  Resp- nonlabored   Abd- tender upper abd, seems better than prev (but pt on analgesics)   Ext-no edema  Neuro-A&Ox3, no focal deficits  Skin-no rashes  Psych- flat affect       Results Reviewed:  LAB RESULTS:      Lab 23  0832 23  0640 23  0514 23  0555   WBC 12.35* 12.69* 9.67 9.79 9.88   HEMOGLOBIN 14.0 14.4 13.2 12.3* 12.9*   HEMATOCRIT 42.5 43.8 40.0 37.8 38.1   PLATELETS 766* 673* 457* 342 305   NEUTROS ABS 8.07*  --   --   --   --    IMMATURE GRANS (ABS) 0.32*  --   --   --   --    LYMPHS ABS 2.08  --   --   --   --    MONOS ABS 1.35*  --   --   --   --    EOS ABS 0.37  --   --   --   --    .0* 100.7* 101.3* 102.7* 100.3*         Lab 23  0523  0832 23  0640 23  1931 23  0514 23  0555   SODIUM 136 137 136  --  138 137   POTASSIUM 4.8 5.1 4.4 4.1 3.0* 3.8   CHLORIDE 98 96* 100  --  101 100   CO2 25.0 25.0 22.0  --  23.0 24.0   ANION GAP 13.0 16.0* 14.0  --  14.0 13.0   BUN 5* 4* 4*  --  4* <2*   CREATININE 0.63* 0.67* 0.71*  --  0.59* 0.59*   EGFR 129.6 127.2 125.0  --  132.2 132.2   GLUCOSE 116* 104* 95  --  109* 121*   CALCIUM 10.2 10.0 8.8  --  9.1 8.5*   MAGNESIUM  --   --   --   --  2.3  --          Lab  04/08/23  0523 04/06/23  0832 04/05/23  0640 04/04/23  0514 04/03/23  0555   TOTAL PROTEIN 7.1 7.5 6.1  --  6.4   ALBUMIN 3.8 4.0 3.2*  --  3.2*   GLOBULIN 3.3 3.5 2.9  --  3.2   ALT (SGPT) 58* 43* 24  --  17   AST (SGOT) 82* 66* 49*  --  42*   BILIRUBIN 0.3 0.4 0.4  --  0.8   ALK PHOS 254* 264* 206*  --  209*   LIPASE 104* 121* 147* 112* 97*                     Brief Urine Lab Results  (Last result in the past 365 days)      Color   Clarity   Blood   Leuk Est   Nitrite   Protein   CREAT   Urine HCG        03/30/23 0910 [place]   Cloudy   Trace   Negative   Positive   100 mg/dL (2+)                 Microbiology Results Abnormal     None          US Gallbladder    Result Date: 4/8/2023  US GALLBLADDER Date of Exam: 4/8/2023 9:44 AM EDT Indication: Acute pancreatitis, elevated LFTs. Comparison: CT abdomen and pelvis 4/7/2023 Technique: Grayscale and color Doppler ultrasound evaluation of the right upper quadrant was performed. Findings: Pancreas appears hypoechoic and edematous consistent with pancreatitis seen on the prior CT exam. No organized fluid collection adjacent to the pancreas within the field-of-view of this exam. The hepatic echogenicity is mildly increased suggesting fatty infiltration. No discrete liver lesion. No perihepatic ascites. There is hepatopedal flow in the portal vein. Visualized hepatic veins are patent. Gallbladder present. Negative for cholelithiasis. No pericholecystic fluid. Negative for gallbladder wall thickening. Common bile duct measures 6-7 mm. The right kidney measures 11.4 x 4.5 x 5.1 cm. No right-sided hydronephrosis.     Impression: Impression: 1. Edema involving pancreas consistent with pancreatitis better assessed on CT. 2. Hepatic steatosis. 3. Common bile duct at the upper limits of normal measuring 6 to 7 mm, stable from CT. 4. Normal gallbladder. Electronically Signed: Sudhir Mathur  4/8/2023 10:28 AM EDT  Workstation ID: [id number]    XR Abdomen KUB    Result Date: 4/7/2023  XR  ABDOMEN KUB Date of Exam: 4/7/2023 12:59 PM EDT Indication: Constipation. Comparison: CT abdomen and pelvis 3/30/2023, 4/4/2023; KUB 4/6/2023 Findings: No abnormal small bowel distention is seen. There is stool in the ascending colon and descending colon without abnormal colonic distention. Stable right pelvic calcification is likely a phlebolith.     Impression: Impression: Nonobstructive bowel gas pattern. Stool in the ascending and descending colon. Electronically Signed: Delfinatori Sherman  4/7/2023 1:39 PM EDT  Workstation ID: [id number]          Current medications:  Scheduled Meds:enoxaparin, 40 mg, Subcutaneous, Nightly  hydrocortisone, 1 application, Topical, Q12H  polyethylene glycol, 17 g, Oral, 4x Daily  senna-docusate sodium, 2 tablet, Oral, BID  sodium chloride, 10 mL, Intravenous, Q12H      Continuous Infusions:   PRN Meds:.•  acetaminophen **OR** acetaminophen **OR** acetaminophen  •  senna-docusate sodium **AND** polyethylene glycol **AND** bisacodyl **AND** bisacodyl  •  Calcium Replacement - Follow Nurse / BPA Driven Protocol  •  cloNIDine  •  HYDROmorphone  •  LORazepam  •  Magnesium Standard Dose Replacement - Follow Nurse / BPA Driven Protocol  •  nicotine polacrilex  •  ondansetron **OR** ondansetron  •  oxyCODONE-acetaminophen  •  Phosphorus Replacement - Follow Nurse / BPA Driven Protocol  •  Potassium Replacement - Follow Nurse / BPA Driven Protocol  •  prochlorperazine  •  sodium chloride  •  sodium chloride    Assessment & Plan   Assessment & Plan     Active Hospital Problems    Diagnosis  POA   • **Severe acute pancreatitis [K85.90]  Unknown   • Alcohol withdrawal [F10.939]  Yes   • Lactic acidosis [E87.20]  Unknown      Resolved Hospital Problems   No resolved problems to display.        Brief Hospital Course to date:  Robson Miller is a 32 y.o. male with hx of alcohol abuse presents due to abdominal pain. Found to have severe acute pancreatitis. GI has been consulted to assist with  "management.    Severe acute pancreatitis due to alcohol  Alcoholic ketoacidosis  Lactic acidosis  --Admission CT A/P:  severe acute pancreatitis , mesenteric edema, free fluid, no necrosis or pancreatic fluid collection noted; diffuse hepatic steatosis  - scan repeated 4/4:  No change   --Lipid profile normal  -- s/p aggressive IV fluids, discontinued 4/4/23  - advanced to solids but backed off to fulls due to increased pain 4/4.   -- GI following,  - -Pain control, taking Perc 10s about qid and dilaudid BID and Ativan to deal w pain QID  - will cut ativan doses in half in preparation for DC   - Told him frankly that pancreatitis can kill, and his pancreas may not survive another bout.      Hypomagnesemia  Hypokalemia  --Replacement protocol ordered    Constipation  - diaphoresis with Relistor, doesn't want to retry  - miralax QID.    - advised pt that he and nurse can decide re enema and suppository.    - hopefully will not need gastrgraffin enema.      HTN  --No previous diagnosis, not on any home meds  --Likely multifactorial due to pain and alcohol withdrawal  --PRN Clonidine    Alcohol abuse and withdrawal   Long history of depression   --Reports 10-12 drinks daily, bourbon or gin  - Acute withdrawal is complete.    --taper off ativan.   - Discussion today:  Advised him that he should stay through weekend nad talk to Chem Dep on Monday.  He currently has no plan for when he leaves.  Ms Whitney talked to him on 4/4 but at that time he declined AUD resources.   - I offered telepsych visit next week.  He was noncommittal.  Did say, \"I have been on every psych med; I won't take any more\" (expressed history of side effects, lack of efficacy )  - Pt has not expressed suicidal ideation but his father has arrived from [place] and is concerned that pt's life is in danger from ETOH, depression, lack of plan.        Expected Discharge Location and Transportation: home  Expected Discharge possibly 1-2 days?  Expected Discharge " Date and Time     Expected Discharge Date Expected Discharge Time    Apr 8, 2023            DVT prophylaxis:  Medical DVT prophylaxis orders are present.          CODE STATUS:   Code Status and Medical Interventions:   Ordered at: 03/30/23 1416     Level Of Support Discussed With:    Patient     Code Status (Patient has no pulse and is not breathing):    CPR (Attempt to Resuscitate)     Medical Interventions (Patient has pulse or is breathing):    Full Support       Margaret Hogue MD  04/08/23

## 2023-04-09 PROCEDURE — 99232 SBSQ HOSP IP/OBS MODERATE 35: CPT | Performed by: INTERNAL MEDICINE

## 2023-04-09 PROCEDURE — 25010000002 KETOROLAC TROMETHAMINE PER 15 MG: Performed by: INTERNAL MEDICINE

## 2023-04-09 PROCEDURE — 25010000002 HYDROMORPHONE PER 4 MG: Performed by: HOSPITALIST

## 2023-04-09 RX ORDER — KETOROLAC TROMETHAMINE 30 MG/ML
30 INJECTION, SOLUTION INTRAMUSCULAR; INTRAVENOUS EVERY 6 HOURS PRN
Status: DISCONTINUED | OUTPATIENT
Start: 2023-04-09 | End: 2023-04-13 | Stop reason: HOSPADM

## 2023-04-09 RX ADMIN — NICOTINE POLACRILEX 2 MG: 2 LOZENGE ORAL at 20:52

## 2023-04-09 RX ADMIN — NICOTINE POLACRILEX 2 MG: 2 LOZENGE ORAL at 00:21

## 2023-04-09 RX ADMIN — OXYCODONE HYDROCHLORIDE AND ACETAMINOPHEN 1 TABLET: 10; 325 TABLET ORAL at 13:22

## 2023-04-09 RX ADMIN — HYDROMORPHONE HYDROCHLORIDE 0.5 MG: 1 INJECTION, SOLUTION INTRAMUSCULAR; INTRAVENOUS; SUBCUTANEOUS at 20:44

## 2023-04-09 RX ADMIN — HYDROCORTISONE 1 APPLICATION: 1 CREAM TOPICAL at 20:47

## 2023-04-09 RX ADMIN — OXYCODONE HYDROCHLORIDE AND ACETAMINOPHEN 1 TABLET: 10; 325 TABLET ORAL at 09:13

## 2023-04-09 RX ADMIN — Medication 10 ML: at 20:45

## 2023-04-09 RX ADMIN — LORAZEPAM 0.5 MG: 0.5 TABLET ORAL at 16:39

## 2023-04-09 RX ADMIN — KETOROLAC TROMETHAMINE 30 MG: 30 INJECTION, SOLUTION INTRAMUSCULAR; INTRAVENOUS at 16:39

## 2023-04-09 RX ADMIN — HYDROMORPHONE HYDROCHLORIDE 0.5 MG: 1 INJECTION, SOLUTION INTRAMUSCULAR; INTRAVENOUS; SUBCUTANEOUS at 00:21

## 2023-04-09 RX ADMIN — LORAZEPAM 0.5 MG: 0.5 TABLET ORAL at 22:42

## 2023-04-09 RX ADMIN — CLONIDINE HYDROCHLORIDE 0.1 MG: 0.1 TABLET ORAL at 16:39

## 2023-04-09 RX ADMIN — KETOROLAC TROMETHAMINE 30 MG: 30 INJECTION, SOLUTION INTRAMUSCULAR; INTRAVENOUS at 22:42

## 2023-04-09 RX ADMIN — HYDROCORTISONE 1 APPLICATION: 1 CREAM TOPICAL at 09:14

## 2023-04-09 RX ADMIN — POLYETHYLENE GLYCOL 3350 17 G: 17 POWDER, FOR SOLUTION ORAL at 09:13

## 2023-04-09 RX ADMIN — OXYCODONE HYDROCHLORIDE AND ACETAMINOPHEN 1 TABLET: 10; 325 TABLET ORAL at 18:49

## 2023-04-09 RX ADMIN — CLONIDINE HYDROCHLORIDE 0.1 MG: 0.1 TABLET ORAL at 00:24

## 2023-04-09 RX ADMIN — HYDROMORPHONE HYDROCHLORIDE 0.5 MG: 1 INJECTION, SOLUTION INTRAMUSCULAR; INTRAVENOUS; SUBCUTANEOUS at 14:01

## 2023-04-09 RX ADMIN — NICOTINE POLACRILEX 2 MG: 2 LOZENGE ORAL at 18:49

## 2023-04-09 RX ADMIN — LORAZEPAM 0.5 MG: 0.5 TABLET ORAL at 09:28

## 2023-04-09 RX ADMIN — OXYCODONE HYDROCHLORIDE AND ACETAMINOPHEN 1 TABLET: 10; 325 TABLET ORAL at 23:36

## 2023-04-09 RX ADMIN — SENNOSIDES AND DOCUSATE SODIUM 2 TABLET: 8.6; 5 TABLET ORAL at 09:13

## 2023-04-09 RX ADMIN — Medication 10 ML: at 09:12

## 2023-04-09 NOTE — PLAN OF CARE
Goal Outcome Evaluation:  Plan of Care Reviewed With: patient           Outcome Evaluation: Pt able to tolerate full liquid diet. PRN percocet, dilaudid, ativan given overnight. NSR when sleeping monitor. ST when awake in chair. RA overnight. Pt reported 2 BM's. Will continue POC.

## 2023-04-09 NOTE — PLAN OF CARE
Goal Outcome Evaluation:           Progress: no change   Pt still tachycardic most of the time today. Appetite is improving and pain seems better controlled with prn's today. Will cont to monitor.

## 2023-04-09 NOTE — PROGRESS NOTES
[facility] Medicine Services  PROGRESS NOTE    Patient Name: Robson Miller  : 1990  MRN: [id number]    Date of Admission: 3/30/2023  Primary Care Physician: Claudette Ho MD    Subjective   Subjective     CC:  F/U abdominal pain    HPI: Tolerated full liquid diet, still having pain.  Having BMs .  Remains afebrile.  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69* 9.67 9.79 9.88   HEMOGLOBIN 14.0 14.4 13.2 12.3* 12.9*   HEMATOCRIT 42.5 43.8 40.0 37.8 38.1   PLATELETS 766* 673* 457* 342 305   NEUTROS ABS 8.07*  --   --   --   --    IMMATURE GRANS (ABS) 0.32*  --   --   --   --    LYMPHS ABS 2.08  --   --   --   --    MONOS ABS 1.35*  --   --   --   --    EOS ABS 0.37  --   --   --   --    .0* 100.7* 101.3* 102.7* 100.3*         Lab 23  0523  0832 23  0640 231 23  0514 23  0555   SODIUM 136 137 136  --  138 137   POTASSIUM 4.8 5.1 4.4 4.1 3.0* 3.8   CHLORIDE 98 96* 100  --  101 100   CO2 25.0 25.0 22.0  --  23.0 24.0   ANION GAP 13.0 16.0* 14.0  --  14.0 13.0   BUN 5* 4* 4*  --  4* <2*   CREATININE 0.63* 0.67* 0.71*  --  0.59* 0.59*   EGFR 129.6 127.2 125.0  --  132.2 132.2   GLUCOSE 116* 104* 95  --  109* 121*   CALCIUM 10.2 10.0 8.8   --  9.1 8.5*   MAGNESIUM  --   --   --   --  2.3  --          Lab 04/08/23  0523 04/06/23  0832 04/05/23  0640 04/04/23  0514 04/03/23  0555   TOTAL PROTEIN 7.1 7.5 6.1  --  6.4   ALBUMIN 3.8 4.0 3.2*  --  3.2*   GLOBULIN 3.3 3.5 2.9  --  3.2   ALT (SGPT) 58* 43* 24  --  17   AST (SGOT) 82* 66* 49*  --  42*   BILIRUBIN 0.3 0.4 0.4  --  0.8   ALK PHOS 254* 264* 206*  --  209*   LIPASE 104* 121* 147* 112* 97*                     Brief Urine Lab Results  (Last result in the past 365 days)      Color   Clarity   Blood   Leuk Est   Nitrite   Protein   CREAT   Urine HCG        03/30/23 0910 [place]   Cloudy   Trace   Negative   Positive   100 mg/dL (2+)                 Microbiology Results Abnormal     None          US Gallbladder    Result Date: 4/8/2023  US GALLBLADDER Date of Exam: 4/8/2023 9:44 AM EDT Indication: Acute pancreatitis, elevated LFTs. Comparison: CT abdomen and pelvis 4/7/2023 Technique: Grayscale and color Doppler ultrasound evaluation of the right upper quadrant was performed. Findings: Pancreas appears hypoechoic and edematous consistent with pancreatitis seen on the prior CT exam. No organized fluid collection adjacent to the pancreas within the field-of-view of this exam. The hepatic echogenicity is mildly increased suggesting fatty infiltration. No discrete liver lesion. No perihepatic ascites. There is hepatopedal flow in the portal vein. Visualized hepatic veins are patent. Gallbladder present. Negative for cholelithiasis. No pericholecystic fluid. Negative for gallbladder wall thickening. Common bile duct measures 6-7 mm. The right kidney measures 11.4 x 4.5 x 5.1 cm. No right-sided hydronephrosis.     Impression: Impression: 1. Edema involving pancreas consistent with pancreatitis better assessed on CT. 2. Hepatic steatosis. 3. Common bile duct at the upper limits of normal measuring 6 to 7 mm, stable from CT. 4. Normal gallbladder. Electronically Signed: Sudhir Mathur  4/8/2023 10:28 AM EDT   Workstation ID: [id number]          Current medications:  Scheduled Meds:hydrocortisone, 1 application, Topical, Q12H  polyethylene glycol, 17 g, Oral, BID  senna-docusate sodium, 2 tablet, Oral, BID  sodium chloride, 10 mL, Intravenous, Q12H      Continuous Infusions:   PRN Meds:.•  acetaminophen **OR** acetaminophen **OR** acetaminophen  •  senna-docusate sodium **AND** polyethylene glycol **AND** bisacodyl **AND** bisacodyl  •  Calcium Replacement - Follow Nurse / BPA Driven Protocol  •  cloNIDine  •  HYDROmorphone  •  ketorolac  •  LORazepam  •  Magnesium Standard Dose Replacement - Follow Nurse / BPA Driven Protocol  •  nicotine polacrilex  •  ondansetron **OR** ondansetron  •  oxyCODONE-acetaminophen  •  Phosphorus Replacement - Follow Nurse / BPA Driven Protocol  •  Potassium Replacement - Follow Nurse / BPA Driven Protocol  •  prochlorperazine  •  sodium chloride  •  sodium chloride    Assessment & Plan   Assessment & Plan     Active Hospital Problems    Diagnosis  POA   • **Severe acute pancreatitis [K85.90]  Unknown   • Alcohol withdrawal [F10.939]  Yes   • Lactic acidosis [E87.20]  Unknown      Resolved Hospital Problems   No resolved problems to display.        Brief Hospital Course to date:  Robson Miller is a 32 y.o. male with hx of alcohol abuse presents due to abdominal pain. Found to have severe acute pancreatitis. GI has been consulted to assist with management.    Severe acute pancreatitis due to alcohol  Alcoholic ketoacidosis  Lactic acidosis  --Admission CT A/P:  severe acute pancreatitis , mesenteric edema, free fluid, no necrosis or pancreatic fluid collection noted; diffuse hepatic steatosis  - scan repeated 4/4:  No change   --Lipid profile normal  -- s/p aggressive IV fluids, discontinued 4/4/23  -- GI following,  - -Pain control:  Add back toradol. He and I discussed going home with about 5 days of pain meds nad PCP followup.   - Told him again that his life is at stake       Hypomagnesemia  Hypokalemia  --Replacement protocol ordered    Constipation  - diaphoresis with Relistor, doesn't want to retry  - miralax QID.    - advised pt that he and nurse can decide re enema and suppository.    - hopefully will not need gastrgraffin enema.      HTN  --No previous diagnosis, not on any home meds  --Likely multifactorial due to pain and alcohol withdrawal  --PRN Clonidine    Alcohol abuse and withdrawal   Long history of depression   --Reports 10-12 drinks daily, bourbon or gin  - Acute withdrawal is complete.    --taper off ativan.   - Discussion today: He is willing to talk to telepsych though is averse to psych meds or inpatient treatment for psych or alcohol due to past bad experiences.  Might consider outpt resources.    - Does not remember talking to Chem Dep counselor but says he will listen if she comes back.   - Pt has not expressed suicidal ideation but his fatheris concerned that pt's life is in danger from ETOH, depression, lack of plan.        Expected Discharge Location and Transportation: home  Expected Discharge possibly 1-2 days?  Expected Discharge Date and Time     Expected Discharge Date Expected Discharge Time    Apr 12, 2023            DVT prophylaxis:  No DVT prophylaxis order currently exists.          CODE STATUS:   Code Status and Medical Interventions:   Ordered at: 03/30/23 1416     Level Of Support Discussed With:    Patient     Code Status (Patient has no pulse and is not breathing):    CPR (Attempt to Resuscitate)     Medical Interventions (Patient has pulse or is breathing):    Full Support       Margaret Hogue MD  04/09/23

## 2023-04-10 ENCOUNTER — TELEPHONE (OUTPATIENT)
Dept: PSYCHIATRY | Facility: CLINIC | Age: 33
End: 2023-04-10
Payer: COMMERCIAL

## 2023-04-10 LAB
ANION GAP SERPL CALCULATED.3IONS-SCNC: 12 MMOL/L (ref 5–15)
BUN SERPL-MCNC: 6 MG/DL (ref 6–20)
BUN/CREAT SERPL: 7.4 (ref 7–25)
CALCIUM SPEC-SCNC: 9.8 MG/DL (ref 8.6–10.5)
CHLORIDE SERPL-SCNC: 98 MMOL/L (ref 98–107)
CO2 SERPL-SCNC: 27 MMOL/L (ref 22–29)
CREAT SERPL-MCNC: 0.81 MG/DL (ref 0.76–1.27)
DEPRECATED RDW RBC AUTO: 53.8 FL (ref 37–54)
EGFRCR SERPLBLD CKD-EPI 2021: 120.1 ML/MIN/1.73
ERYTHROCYTE [DISTWIDTH] IN BLOOD BY AUTOMATED COUNT: 14.3 % (ref 12.3–15.4)
GLUCOSE SERPL-MCNC: 101 MG/DL (ref 65–99)
HCT VFR BLD AUTO: 42.7 % (ref 37.5–51)
HGB BLD-MCNC: 13.6 G/DL (ref 13–17.7)
MCH RBC QN AUTO: 32.5 PG (ref 26.6–33)
MCHC RBC AUTO-ENTMCNC: 31.9 G/DL (ref 31.5–35.7)
MCV RBC AUTO: 101.9 FL (ref 79–97)
PLATELET # BLD AUTO: 854 10*3/MM3 (ref 140–450)
PMV BLD AUTO: 9.4 FL (ref 6–12)
POTASSIUM SERPL-SCNC: 4.9 MMOL/L (ref 3.5–5.2)
RBC # BLD AUTO: 4.19 10*6/MM3 (ref 4.14–5.8)
SODIUM SERPL-SCNC: 137 MMOL/L (ref 136–145)
WBC NRBC COR # BLD: 9.86 10*3/MM3 (ref 3.4–10.8)

## 2023-04-10 PROCEDURE — 25010000002 HYDROMORPHONE PER 4 MG: Performed by: HOSPITALIST

## 2023-04-10 PROCEDURE — 80048 BASIC METABOLIC PNL TOTAL CA: CPT | Performed by: INTERNAL MEDICINE

## 2023-04-10 PROCEDURE — 25010000002 KETOROLAC TROMETHAMINE PER 15 MG: Performed by: INTERNAL MEDICINE

## 2023-04-10 PROCEDURE — 85027 COMPLETE CBC AUTOMATED: CPT | Performed by: INTERNAL MEDICINE

## 2023-04-10 PROCEDURE — 99232 SBSQ HOSP IP/OBS MODERATE 35: CPT | Performed by: NURSE PRACTITIONER

## 2023-04-10 PROCEDURE — 25010000002 HYDROMORPHONE PER 4 MG: Performed by: INTERNAL MEDICINE

## 2023-04-10 PROCEDURE — 99233 SBSQ HOSP IP/OBS HIGH 50: CPT | Performed by: INTERNAL MEDICINE

## 2023-04-10 RX ORDER — HYDROMORPHONE HYDROCHLORIDE 1 MG/ML
0.5 INJECTION, SOLUTION INTRAMUSCULAR; INTRAVENOUS; SUBCUTANEOUS 2 TIMES DAILY PRN
Status: DISCONTINUED | OUTPATIENT
Start: 2023-04-11 | End: 2023-04-12

## 2023-04-10 RX ORDER — OXYCODONE AND ACETAMINOPHEN 10; 325 MG/1; MG/1
1 TABLET ORAL EVERY 6 HOURS PRN
Status: DISCONTINUED | OUTPATIENT
Start: 2023-04-10 | End: 2023-04-13 | Stop reason: HOSPADM

## 2023-04-10 RX ADMIN — HYDROMORPHONE HYDROCHLORIDE 0.5 MG: 1 INJECTION, SOLUTION INTRAMUSCULAR; INTRAVENOUS; SUBCUTANEOUS at 02:23

## 2023-04-10 RX ADMIN — LORAZEPAM 0.5 MG: 0.5 TABLET ORAL at 20:18

## 2023-04-10 RX ADMIN — HYDROMORPHONE HYDROCHLORIDE 0.5 MG: 1 INJECTION, SOLUTION INTRAMUSCULAR; INTRAVENOUS; SUBCUTANEOUS at 09:38

## 2023-04-10 RX ADMIN — OXYCODONE HYDROCHLORIDE AND ACETAMINOPHEN 1 TABLET: 10; 325 TABLET ORAL at 21:05

## 2023-04-10 RX ADMIN — HYDROMORPHONE HYDROCHLORIDE 0.5 MG: 1 INJECTION, SOLUTION INTRAMUSCULAR; INTRAVENOUS; SUBCUTANEOUS at 15:41

## 2023-04-10 RX ADMIN — HYDROCORTISONE 1 APPLICATION: 1 CREAM TOPICAL at 09:44

## 2023-04-10 RX ADMIN — LORAZEPAM 0.5 MG: 0.5 TABLET ORAL at 14:33

## 2023-04-10 RX ADMIN — OXYCODONE HYDROCHLORIDE AND ACETAMINOPHEN 1 TABLET: 10; 325 TABLET ORAL at 11:07

## 2023-04-10 RX ADMIN — NICOTINE POLACRILEX 2 MG: 2 LOZENGE ORAL at 15:56

## 2023-04-10 RX ADMIN — KETOROLAC TROMETHAMINE 30 MG: 30 INJECTION, SOLUTION INTRAMUSCULAR; INTRAVENOUS at 15:41

## 2023-04-10 RX ADMIN — KETOROLAC TROMETHAMINE 30 MG: 30 INJECTION, SOLUTION INTRAMUSCULAR; INTRAVENOUS at 09:38

## 2023-04-10 RX ADMIN — HYDROMORPHONE HYDROCHLORIDE 0.5 MG: 1 INJECTION, SOLUTION INTRAMUSCULAR; INTRAVENOUS; SUBCUTANEOUS at 21:58

## 2023-04-10 RX ADMIN — NICOTINE POLACRILEX 2 MG: 2 LOZENGE ORAL at 02:26

## 2023-04-10 RX ADMIN — SENNOSIDES AND DOCUSATE SODIUM 2 TABLET: 8.6; 5 TABLET ORAL at 09:38

## 2023-04-10 RX ADMIN — Medication 10 ML: at 09:45

## 2023-04-10 RX ADMIN — OXYCODONE HYDROCHLORIDE AND ACETAMINOPHEN 1 TABLET: 10; 325 TABLET ORAL at 15:01

## 2023-04-10 RX ADMIN — Medication 10 ML: at 20:18

## 2023-04-10 RX ADMIN — KETOROLAC TROMETHAMINE 30 MG: 30 INJECTION, SOLUTION INTRAMUSCULAR; INTRAVENOUS at 21:58

## 2023-04-10 NOTE — PLAN OF CARE
Goal Outcome Evaluation:  Plan of Care Reviewed With: patient        Progress: no change  Outcome Evaluation: VSS. PRN pain medications given when requested. NSR-ST. no reports of soa. RA. no acute overnight events continue plan of care

## 2023-04-10 NOTE — CASE MANAGEMENT/SOCIAL WORK
"Continued Stay Note  [facility]     Patient Name: Robson Miller  MRN: [id number]  Today's Date: 4/10/2023    Admit Date: 3/30/2023    Plan: Home with AUD Resources   Discharge Plan     Row Name 04/10/23 1721       Plan    Plan Home with AUD Resources    Plan Comments Spoke to patient at bedside. Patient's dad was also present in the room. His dad talked about the long history of alcoholism in the family, going back to his great-grandfather. Patient stated that he did not want to go to inpatient treatment and he did not want to work the Alcoholics Anonymous (AA) program. I discussed [facility] and all the of different types of self-help meetings, specifically [employer], they offer. He stated that he did not think self-help meetings were really for him because he did not like to discuss his personal life in a group setting. He also reported that he is leaving [place] in May to go to [place], [place] for the summer to spend time with his partner, so whatever he decides to do to work on his sobriety it will also have to be available there. I explained that meetings are available all over the world. I also provided him with the website: [employer] and explained there are meetings available 24/7 from all over the world. I highly encouraged the patient to reach out to others in the recovery community and build a support network, outside of his family and utilize them. Robson stated that he has a tele-health appointment with a therapist soon and he hopes to continue those, which he feels will help with his recovery journey as well. Robson stated several times, throughout the conversation, that he wondered if he could ever be a \"normal drinker\" one day. I offered to bring him a copy of [employer] AA and he accepted. I also provided he and his dad with my outreach number for assistance post discharge from the hospital.               Discharge Codes    No documentation.               Expected Discharge Date and Time  "    Expected Discharge Date Expected Discharge Time    Apr 12, 2023             Keyla Bunn   [facility]  /PSS  (Ext. [phone])

## 2023-04-10 NOTE — PROGRESS NOTES
"GI Daily Progress Note  Subjective:    Chief Complaint: Pancreatitis    Patient is a 32-year-old male admitted with severe acute pancreatitis.    This morning, he is sitting in bed eating a soft diet.  He still complains of pain although states that it is nothing like he has had previously.  He reports he typically takes pain medication, Dilaudid shortly after eating in anticipation of having pain.  He reports it just feels like that is a good time to take his pain medication.    His last bowel movement was yesterday and he feels like he will likely have one today after eating.    Heart rate improving.    Objective:    BP 94/64 (BP Location: Right arm, Patient Position: Lying)   Pulse 86   Temp 96.3 °F (35.7 °C) (Oral)   Resp 16   Ht 172.7 cm (68\")   Wt 74.8 kg (165 lb)   SpO2 96%   BMI 25.09 kg/m²     Physical Exam  Constitutional:       Appearance: Normal appearance.   HENT:      Head: Normocephalic and atraumatic.   Pulmonary:      Effort: Pulmonary effort is normal.   Neurological:      Mental Status: He is alert and oriented to person, place, and time.   Psychiatric:         Mood and Affect: Mood normal.         Thought Content: Thought content normal.         Lab  Lab Results   Component Value Date    WBC 9.86 04/10/2023    HGB 13.6 04/10/2023    HGB 14.0 04/08/2023    HGB 14.4 04/06/2023    .9 (H) 04/10/2023     (H) 04/10/2023       Lab Results   Component Value Date    GLUCOSE 101 (H) 04/10/2023    BUN 6 04/10/2023    CREATININE 0.81 04/10/2023    EGFRIFNONA 97 11/20/2020    EGFRIFAFRI 118 11/20/2020    BCR 7.4 04/10/2023     04/10/2023    K 4.9 04/10/2023    CO2 27.0 04/10/2023    CALCIUM 9.8 04/10/2023    ALBUMIN 3.8 04/08/2023    ALKPHOS 254 (H) 04/08/2023    BILITOT 0.3 04/08/2023    ALT 58 (H) 04/08/2023    AST 82 (H) 04/08/2023     US GALLBLADDER     Date of Exam: 4/8/2023 9:44 AM EDT     Indication: Acute pancreatitis, elevated LFTs.     Comparison: CT abdomen and pelvis " 4/7/2023     Technique: Grayscale and color Doppler ultrasound evaluation of the right upper quadrant was performed.     Findings:  Pancreas appears hypoechoic and edematous consistent with pancreatitis seen on the prior CT exam. No organized fluid collection adjacent to the pancreas within the field-of-view of this exam. The hepatic echogenicity is mildly increased suggesting fatty   infiltration. No discrete liver lesion. No perihepatic ascites.      There is hepatopedal flow in the portal vein. Visualized hepatic veins are patent. Gallbladder present. Negative for cholelithiasis. No pericholecystic fluid. Negative for gallbladder wall thickening. Common bile duct measures 6-7 mm. The right kidney   measures 11.4 x 4.5 x 5.1 cm. No right-sided hydronephrosis.     IMPRESSION:  Impression:  1. Edema involving pancreas consistent with pancreatitis better assessed on CT.  2. Hepatic steatosis.  3. Common bile duct at the upper limits of normal measuring 6 to 7 mm, stable from CT.  4. Normal gallbladder.     Electronically Signed: Sudhir Mouser    4/8/2023 10:28 AM EDT     CT ABDOMEN PELVIS W WO CONTRAST     Date of Exam: 4/4/2023 11:54 AM EDT     Indication: pancreatitis, persistent symptoms and fevers..     Comparison: CT abdomen and pelvis 3/30/2023     Technique: Axial CT images were obtained of the abdomen and pelvis before and after the uneventful intravenous administration of 90 mL Isovue-300. Sagittal and coronal reconstructions were performed.  Automated exposure control and iterative   reconstruction methods were used.      Findings:  There has been interval development of small bilateral pleural effusions with associated bibasilar atelectasis.     Redemonstration of acute interstitial pancreatitis as evidenced by mildly swollen/edematous pancreas with peripancreatic fat stranding and wispy linear nonorganized peripancreatic fluid. No pancreatic ductal dilatation. No hypoenhancing pancreatic   parenchyma to  suggest pancreatic necrosis. No evidence of pseudocyst. There is thin linear retroperitoneal fluid inferior to the pancreatic tail (series 3 image 61), similar to prior. No evidence of discrete pancreatic lesion. Pancreatic ductal anatomy   is difficult to delineate. The adjacent splenic vein remains patent.     Normal appearance of the liver, gallbladder, bile ducts, spleen, adrenal glands, kidneys, ureters, bladder, prostate, and seminal vesicles. No evidence of bowel obstruction or ileus. There is fluid and ingested material within the stomach. There is fluid   throughout the duodenum which otherwise appears unremarkable. There is some formed stool in the right colon with the left colon appearing largely decompressed. No pneumoperitoneum. Unremarkable appearance of the vasculature. The body wall soft tissues   are normal. No lymphadenopathy. No acute or suspicious bony findings.     IMPRESSION:  Impression:  Similar appearance of acute interstitial pancreatitis. No evidence of pseudocyst. No pancreatic necrosis. Interval development of small bilateral pleural effusions.     Electronically Signed: Jorje Hurtado    4/4/2023 12:58 PM EDT     Assessment:      Severe acute pancreatitis    Alcohol withdrawal    Lactic acidosis    Plan:  >> Advised patient to get out of bed and move around chair some, sitting in a chair.  >> Continue diet  >> Continue MiraLAX    Arya Riojas, ANAID  04/10/23  09:56 EDT

## 2023-04-10 NOTE — CASE MANAGEMENT/SOCIAL WORK
Continued Stay Note  Psychiatric     Patient Name: Robson Miller  MRN: [id number]  Today's Date: 4/10/2023    Admit Date: 3/30/2023    Plan: SW: Telepsych Evaluation for Wed 4/12 at 2:00pm.   Discharge Plan     Row Name 04/10/23 1210       Plan    Plan SW: Telepsych Evaluation for Wed 4/12 at 2:00pm.    Plan Comments SW’er spoke with Patricia at  [facility]: [facility]. SW’er scheduled Telepsych Evaluation for Wed 4/12 at 2:00pm. SW can be called [phone] to bring cart to pt's room. CM linked consult to ANAID Antunez.  Nurse was informed. SW available to assist MARBIN Gamez @ [phone]               Discharge Codes    No documentation.               Expected Discharge Date and Time     Expected Discharge Date Expected Discharge Time    Apr 12, 2023             MARBIN Young (Kay)

## 2023-04-10 NOTE — PLAN OF CARE
Goal Outcome Evaluation:              Outcome Evaluation: VSS on RA. Pt c/o pain that is temporarily relieved by PRN meds.  Psych and gloria consulted. Will continue plan of care.

## 2023-04-10 NOTE — PROGRESS NOTES
[facility] Medicine Services  PROGRESS NOTE    Patient Name: Robson Miller  : 1990  MRN: [id number]    Date of Admission: 3/30/2023  Primary Care Physician: Claudette Ho MD    Subjective   Subjective     CC:  F/U abdominal pain    HPI: Remains afebrile.   Yesterdy he took dilaudid 0.5x3 doses and toradol x 2 doses and oxycodone x4 doses as well as ativan 0.5 x3 doses.  He is eating and having BMs.  We had a long talk about pain mgmt.      ROS:  Pain got much worse this morning after eating.     Objective   Objective     Vital Signs:   Temp:  [96.3 °F (35.7 °C)-97.2 °F (36.2 °C)] 96.8 °F (36 °C)  Heart Rate:  [] 116  Resp:  [16-18] 18  BP: ()/() 117/93     Physical Exam:  Gen-  Conversant, NAD, on sofa watching TV   HENT- NCAT, mucous membranes moist  CV-    Resp- nonlabored   Abd- tender upper abd,  Ext-no edema  Neuro-A&Ox3, no focal deficits  Skin-no rashes  Psych- continues to look better       Results Reviewed:  LAB RESULTS:      Lab 04/10/23  0657 04/08/23  0523 04/06/23  0832 04/05/23  0640 2314   WBC 9.86 12.35* 12.69* 9.67 9.79   HEMOGLOBIN 13.6 14.0 14.4 13.2 12.3*   HEMATOCRIT 42.7 42.5 43.8 40.0 37.8   PLATELETS 854* 766* 673* 457* 342   NEUTROS ABS  --  8.07*  --   --   --    IMMATURE GRANS (ABS)  --  0.32*  --   --   --    LYMPHS ABS  --  2.08  --   --   --    MONOS ABS  --  1.35*  --   --   --    EOS ABS  --  0.37  --   --   --    .9* 100.0* 100.7* 101.3* 102.7*         Lab 04/10/23  0657 23  0832 23  0640 23  1931 23  0514   SODIUM 137 136 137 136  --  138   POTASSIUM 4.9 4.8 5.1 4.4 4.1 3.0*   CHLORIDE 98 98 96* 100  --  101   CO2 27.0 25.0 25.0 22.0  --  23.0   ANION GAP 12.0 13.0 16.0* 14.0  --  14.0   BUN 6 5* 4* 4*  --  4*   CREATININE 0.81 0.63* 0.67* 0.71*  --  0.59*   EGFR 120.1 129.6 127.2 125.0  --  132.2   GLUCOSE 101* 116* 104* 95  --  109*   CALCIUM 9.8 10.2 10.0  8.8  --  9.1   MAGNESIUM  --   --   --   --   --  2.3         Lab 04/08/23  0523 04/06/23  0832 04/05/23  0640 04/04/23  0514   TOTAL PROTEIN 7.1 7.5 6.1  --    ALBUMIN 3.8 4.0 3.2*  --    GLOBULIN 3.3 3.5 2.9  --    ALT (SGPT) 58* 43* 24  --    AST (SGOT) 82* 66* 49*  --    BILIRUBIN 0.3 0.4 0.4  --    ALK PHOS 254* 264* 206*  --    LIPASE 104* 121* 147* 112*                     Brief Urine Lab Results  (Last result in the past 365 days)      Color   Clarity   Blood   Leuk Est   Nitrite   Protein   CREAT   Urine HCG        03/30/23 0910 [place]   Cloudy   Trace   Negative   Positive   100 mg/dL (2+)                 Microbiology Results Abnormal     None          No radiology results from the last 24 hrs        Current medications:  Scheduled Meds:hydrocortisone, 1 application, Topical, Q12H  polyethylene glycol, 17 g, Oral, BID  senna-docusate sodium, 2 tablet, Oral, BID  sodium chloride, 10 mL, Intravenous, Q12H      Continuous Infusions:   PRN Meds:.•  acetaminophen **OR** acetaminophen **OR** acetaminophen  •  senna-docusate sodium **AND** polyethylene glycol **AND** bisacodyl **AND** bisacodyl  •  Calcium Replacement - Follow Nurse / BPA Driven Protocol  •  cloNIDine  •  HYDROmorphone  •  ketorolac  •  LORazepam  •  Magnesium Standard Dose Replacement - Follow Nurse / BPA Driven Protocol  •  nicotine polacrilex  •  ondansetron **OR** ondansetron  •  oxyCODONE-acetaminophen  •  Phosphorus Replacement - Follow Nurse / BPA Driven Protocol  •  Potassium Replacement - Follow Nurse / BPA Driven Protocol  •  prochlorperazine  •  sodium chloride  •  sodium chloride    Assessment & Plan   Assessment & Plan     Active Hospital Problems    Diagnosis  POA   • **Severe acute pancreatitis [K85.90]  Unknown   • Alcohol withdrawal [F10.939]  Yes   • Lactic acidosis [E87.20]  Unknown      Resolved Hospital Problems   No resolved problems to display.        Brief Hospital Course to date:  Robson Miller is a 32 y.o. male with hx  of alcohol abuse presents due to abdominal pain. Found to have severe acute pancreatitis. GI has been consulted to assist with management.    Severe acute pancreatitis due to alcohol  Alcoholic ketoacidosis  Lactic acidosis  --Admission CT A/P:  severe acute pancreatitis , mesenteric edema, free fluid, no necrosis or pancreatic fluid collection noted; diffuse hepatic steatosis  - scan repeated 4/4:  No change   --Lipid profile normal  -- s/p aggressive IV fluids, discontinued 4/4/23  -- GI following,  - -Pain control:  We discussed that beginning tomorrow he will be on dilaudid BID prn. Can still use percocet up to qid     Hypomagnesemia  Hypokalemia  --Replacement protocol ordered    Constipation - better    HTN  --No previous diagnosis, not on any home meds  --Likely multifactorial due to pain and alcohol withdrawal  --PRN Clonidine    Alcohol abuse and withdrawal   Long history of depression   --Reports 10-12 drinks daily, bourbon or gin  - Acute withdrawal is complete.    --taper off ativan.   - Discussion today: telepsych appointment Weds afternoon  - Chem Dep counselor will return  - Pt has not expressed suicidal ideation but his fatheris concerned that pt's life is in danger from ETOH, depression, lack of plan.        Expected Discharge Location and Transportation: home  Expected Discharge   Expected Discharge Date and Time     Expected Discharge Date Expected Discharge Time    Apr 12, 2023            DVT prophylaxis:  No DVT prophylaxis order currently exists.          CODE STATUS:   Code Status and Medical Interventions:   Ordered at: 03/30/23 1416     Level Of Support Discussed With:    Patient     Code Status (Patient has no pulse and is not breathing):    CPR (Attempt to Resuscitate)     Medical Interventions (Patient has pulse or is breathing):    Full Support       Margaret Hogue MD  04/10/23

## 2023-04-10 NOTE — TELEPHONE ENCOUNTER
Patient has been scheduled with Susie for a tele-sch evaluation for depression on 4/12/23 at 2:00 pm .

## 2023-04-11 PROCEDURE — 99233 SBSQ HOSP IP/OBS HIGH 50: CPT | Performed by: INTERNAL MEDICINE

## 2023-04-11 PROCEDURE — 25010000002 KETOROLAC TROMETHAMINE PER 15 MG: Performed by: INTERNAL MEDICINE

## 2023-04-11 PROCEDURE — 25010000002 HYDROMORPHONE PER 4 MG: Performed by: INTERNAL MEDICINE

## 2023-04-11 RX ORDER — HYDROXYZINE HYDROCHLORIDE 25 MG/1
25 TABLET, FILM COATED ORAL ONCE
Status: COMPLETED | OUTPATIENT
Start: 2023-04-11 | End: 2023-04-11

## 2023-04-11 RX ORDER — LORAZEPAM 0.5 MG/1
0.25 TABLET ORAL EVERY 6 HOURS PRN
Status: DISCONTINUED | OUTPATIENT
Start: 2023-04-11 | End: 2023-04-13 | Stop reason: HOSPADM

## 2023-04-11 RX ORDER — HYDROXYZINE HYDROCHLORIDE 25 MG/1
25 TABLET, FILM COATED ORAL 3 TIMES DAILY PRN
Status: DISCONTINUED | OUTPATIENT
Start: 2023-04-11 | End: 2023-04-12

## 2023-04-11 RX ADMIN — LORAZEPAM 0.5 MG: 0.5 TABLET ORAL at 10:46

## 2023-04-11 RX ADMIN — SENNOSIDES AND DOCUSATE SODIUM 2 TABLET: 8.6; 5 TABLET ORAL at 10:49

## 2023-04-11 RX ADMIN — LORAZEPAM 0.25 MG: 0.5 TABLET ORAL at 23:42

## 2023-04-11 RX ADMIN — OXYCODONE HYDROCHLORIDE AND ACETAMINOPHEN 1 TABLET: 10; 325 TABLET ORAL at 10:46

## 2023-04-11 RX ADMIN — KETOROLAC TROMETHAMINE 30 MG: 30 INJECTION, SOLUTION INTRAMUSCULAR; INTRAVENOUS at 18:33

## 2023-04-11 RX ADMIN — LORAZEPAM 0.25 MG: 0.5 TABLET ORAL at 17:37

## 2023-04-11 RX ADMIN — HYDROMORPHONE HYDROCHLORIDE 0.5 MG: 1 INJECTION, SOLUTION INTRAMUSCULAR; INTRAVENOUS; SUBCUTANEOUS at 18:33

## 2023-04-11 RX ADMIN — HYDROXYZINE HYDROCHLORIDE 25 MG: 25 TABLET, FILM COATED ORAL at 00:32

## 2023-04-11 RX ADMIN — NICOTINE POLACRILEX 2 MG: 2 LOZENGE ORAL at 19:05

## 2023-04-11 RX ADMIN — KETOROLAC TROMETHAMINE 30 MG: 30 INJECTION, SOLUTION INTRAMUSCULAR; INTRAVENOUS at 13:07

## 2023-04-11 RX ADMIN — OXYCODONE HYDROCHLORIDE AND ACETAMINOPHEN 1 TABLET: 10; 325 TABLET ORAL at 23:42

## 2023-04-11 RX ADMIN — OXYCODONE HYDROCHLORIDE AND ACETAMINOPHEN 1 TABLET: 10; 325 TABLET ORAL at 17:37

## 2023-04-11 RX ADMIN — NICOTINE POLACRILEX 2 MG: 2 LOZENGE ORAL at 00:00

## 2023-04-11 RX ADMIN — Medication 10 ML: at 20:52

## 2023-04-11 RX ADMIN — ACETAMINOPHEN 325MG 650 MG: 325 TABLET ORAL at 13:07

## 2023-04-11 NOTE — PROGRESS NOTES
[facility]  PROGRESS NOTE    Patient Name: Robson Miller  : 1990  MRN: [id number]    Date of Admission: 3/30/2023  Primary Care Physician: Claudette Ho MD    Subjective   Subjective     CC:  F/U abdominal pain    HPI: Remains afebrile.  Today is the day we agreed to cut his Dilaudid down to bid prn.   Today he has taken on Percocet 10mg once.  He was anxious last night, got a hydroxyzine, complains that he slept through breakfast.     ROS:  Took a shower, made him feel good   Did not 'mesh' with the chem dep consultant yesterday  Still willing to talk to telepsych     Objective   Objective     Vital Signs:   Temp:  [96.4 °F (35.8 °C)-97.7 °F (36.5 °C)] 96.7 °F (35.9 °C)  Heart Rate:  [] 106  Resp:  [16-18] 16  BP: (107-141)/() 141/98     Physical Exam:  Gen-  Conversant, NAD, looks good , walks easily from bathroom to bed,   HENT- NCAT, mucous membranes moist  CV- HR 80  Resp- nonlabored   Abd- upper abd less tender   Ext-no edema  Neuro-A&Ox3, no focal deficits  Skin-no rashes  Psych- continues to look better       Results Reviewed:  LAB RESULTS:      Lab 04/10/23  0657 04/08/23  0523 04/06/23  0832 23  0640   WBC 9.86 12.35* 12.69* 9.67   HEMOGLOBIN 13.6 14.0 14.4 13.2   HEMATOCRIT 42.7 42.5 43.8 40.0   PLATELETS 854* 766* 673* 457*   NEUTROS ABS  --  8.07*  --   --    IMMATURE GRANS (ABS)  --  0.32*  --   --    LYMPHS ABS  --  2.08  --   --    MONOS ABS  --  1.35*  --   --    EOS ABS  --  0.37  --   --    .9* 100.0* 100.7* 101.3*         Lab 04/10/23  0657 23  0832 23  0640 23   SODIUM 137 136 137 136  --    POTASSIUM 4.9 4.8 5.1 4.4 4.1   CHLORIDE 98 98 96* 100  --    CO2 27.0 25.0 25.0 22.0  --    ANION GAP 12.0 13.0 16.0* 14.0  --    BUN 6 5* 4* 4*  --    CREATININE 0.81 0.63* 0.67* 0.71*  --    EGFR 120.1 129.6 127.2 125.0  --    GLUCOSE 101* 116* 104* 95  --    CALCIUM 9.8 10.2 10.0  8.8  --          Lab 04/08/23  0523 04/06/23  0832 04/05/23  0640   TOTAL PROTEIN 7.1 7.5 6.1   ALBUMIN 3.8 4.0 3.2*   GLOBULIN 3.3 3.5 2.9   ALT (SGPT) 58* 43* 24   AST (SGOT) 82* 66* 49*   BILIRUBIN 0.3 0.4 0.4   ALK PHOS 254* 264* 206*   LIPASE 104* 121* 147*                     Brief Urine Lab Results  (Last result in the past 365 days)      Color   Clarity   Blood   Leuk Est   Nitrite   Protein   CREAT   Urine HCG        03/30/23 0910 [place]   Cloudy   Trace   Negative   Positive   100 mg/dL (2+)                 Microbiology Results Abnormal     None          No radiology results from the last 24 hrs        Current medications:  Scheduled Meds:hydrocortisone, 1 application, Topical, Q12H  polyethylene glycol, 17 g, Oral, BID  senna-docusate sodium, 2 tablet, Oral, BID  sodium chloride, 10 mL, Intravenous, Q12H      Continuous Infusions:   PRN Meds:.•  acetaminophen **OR** acetaminophen **OR** acetaminophen  •  senna-docusate sodium **AND** polyethylene glycol **AND** bisacodyl **AND** bisacodyl  •  Calcium Replacement - Follow Nurse / BPA Driven Protocol  •  cloNIDine  •  HYDROmorphone  •  ketorolac  •  LORazepam  •  Magnesium Standard Dose Replacement - Follow Nurse / BPA Driven Protocol  •  nicotine polacrilex  •  ondansetron **OR** ondansetron  •  oxyCODONE-acetaminophen  •  Phosphorus Replacement - Follow Nurse / BPA Driven Protocol  •  Potassium Replacement - Follow Nurse / BPA Driven Protocol  •  prochlorperazine  •  sodium chloride  •  sodium chloride    Assessment & Plan   Assessment & Plan     Active Hospital Problems    Diagnosis  POA   • **Severe acute pancreatitis [K85.90]  Unknown   • Alcohol withdrawal [F10.939]  Yes   • Lactic acidosis [E87.20]  Unknown      Resolved Hospital Problems   No resolved problems to display.        Brief Hospital Course to date:  Robson Miller is a 32 y.o. male with hx of alcohol abuse presents due to abdominal pain. Found to have severe acute pancreatitis. GI has been  "consulted to assist with management.    Severe acute pancreatitis due to alcohol  Alcoholic ketoacidosis  Lactic acidosis  --Admission CT A/P:  severe acute pancreatitis , mesenteric edema, free fluid, no necrosis or pancreatic fluid collection noted; diffuse hepatic steatosis  - scan repeated 4/4:  No change   --Lipid profile normal  -- s/p aggressive IV fluids, discontinued 4/4/23  -- GI following,  - -Pain control: ongoing discussion.  Reassured him that his HR has declined, he seems much better, should eat slowly, and likely needs less pain meds than he thinks.   Would plan to cut in half weds.      Hypomagnesemia  Hypokalemia  --Replacement protocol ordered    Constipation - better    HTN  --No previous diagnosis, not on any home meds  --Likely multifactorial due to pain and alcohol withdrawal  --PRN Clonidine    Alcohol abuse and withdrawal   Long history of depression and anxiety   --Reports 10-12 drinks daily, bourbon or gin  - Acute withdrawal is complete.    --taper off ativan. (was restarted for uncontrolled pain which has improved)  - Chem Dep counselor returned and gave him info.    - Pt has not expressed suicidal ideation but his fathe ris concerned that pt's life is in danger from ETOH, longstanding severe depression, lack of plan.  Telepsych appt Weds afternoon.  - pt has long history of psych care, some of it traumatizing; continues to state he will not accept inpt treatment, also doesn't want psych meds due to having tried them before, doesn't want group therapy because he is a private person, will consider individual therapy, now says he wants 'something for anxiety but not an SSRI\", also not buspar, does agree to hydroxyzine.  - Again I reiterated that with his life in danger, he should consider all options       Expected Discharge Location and Transportation: home  Expected Discharge   Expected Discharge Date and Time     Expected Discharge Date Expected Discharge Time    Apr 13, 2023        "     DVT prophylaxis:  No DVT prophylaxis order currently exists.          CODE STATUS:   Code Status and Medical Interventions:   Ordered at: 03/30/23 1416     Level Of Support Discussed With:    Patient     Code Status (Patient has no pulse and is not breathing):    CPR (Attempt to Resuscitate)     Medical Interventions (Patient has pulse or is breathing):    Full Support       Margaret Hogue MD  04/11/23

## 2023-04-11 NOTE — PROGRESS NOTES
Patient has bowel function, is tolerating diet, and has no nausea.  Pain of interstitial pancreatitis should have abated by now (10 days into hospitalization).  Agree with weaning opiates.    >> Repeat LFT's tomorrow.

## 2023-04-12 PROBLEM — F32.A ANXIETY AND DEPRESSION: Status: ACTIVE | Noted: 2023-04-12

## 2023-04-12 PROBLEM — F41.9 ANXIETY AND DEPRESSION: Status: ACTIVE | Noted: 2023-04-12

## 2023-04-12 PROBLEM — D75.839 THROMBOCYTOSIS: Status: ACTIVE | Noted: 2023-04-12

## 2023-04-12 PROBLEM — F10.20 ETOH DEPENDENCE: Status: ACTIVE | Noted: 2023-04-12

## 2023-04-12 LAB
ALBUMIN SERPL-MCNC: 3.7 G/DL (ref 3.5–5.2)
ALBUMIN/GLOB SERPL: 1.2 G/DL
ALP SERPL-CCNC: 157 U/L (ref 39–117)
ALT SERPL W P-5'-P-CCNC: 33 U/L (ref 1–41)
ANION GAP SERPL CALCULATED.3IONS-SCNC: 10 MMOL/L (ref 5–15)
AST SERPL-CCNC: 22 U/L (ref 1–40)
BILIRUB SERPL-MCNC: 0.2 MG/DL (ref 0–1.2)
BUN SERPL-MCNC: 6 MG/DL (ref 6–20)
BUN/CREAT SERPL: 7.9 (ref 7–25)
CALCIUM SPEC-SCNC: 9.3 MG/DL (ref 8.6–10.5)
CHLORIDE SERPL-SCNC: 102 MMOL/L (ref 98–107)
CO2 SERPL-SCNC: 24 MMOL/L (ref 22–29)
CREAT SERPL-MCNC: 0.76 MG/DL (ref 0.76–1.27)
EGFRCR SERPLBLD CKD-EPI 2021: 122.5 ML/MIN/1.73
GLOBULIN UR ELPH-MCNC: 3 GM/DL
GLUCOSE SERPL-MCNC: 121 MG/DL (ref 65–99)
POTASSIUM SERPL-SCNC: 3.5 MMOL/L (ref 3.5–5.2)
PROT SERPL-MCNC: 6.7 G/DL (ref 6–8.5)
SODIUM SERPL-SCNC: 136 MMOL/L (ref 136–145)

## 2023-04-12 PROCEDURE — 80053 COMPREHEN METABOLIC PANEL: CPT | Performed by: INTERNAL MEDICINE

## 2023-04-12 PROCEDURE — 25010000002 HYDROMORPHONE PER 4 MG: Performed by: INTERNAL MEDICINE

## 2023-04-12 PROCEDURE — 25010000002 KETOROLAC TROMETHAMINE PER 15 MG: Performed by: INTERNAL MEDICINE

## 2023-04-12 PROCEDURE — 99232 SBSQ HOSP IP/OBS MODERATE 35: CPT | Performed by: PHYSICIAN ASSISTANT

## 2023-04-12 PROCEDURE — 90792 PSYCH DIAG EVAL W/MED SRVCS: CPT | Performed by: NURSE PRACTITIONER

## 2023-04-12 PROCEDURE — 99233 SBSQ HOSP IP/OBS HIGH 50: CPT | Performed by: INTERNAL MEDICINE

## 2023-04-12 RX ORDER — HYDROXYZINE HYDROCHLORIDE 10 MG/1
10 TABLET, FILM COATED ORAL 3 TIMES DAILY PRN
Status: DISCONTINUED | OUTPATIENT
Start: 2023-04-12 | End: 2023-04-13 | Stop reason: HOSPADM

## 2023-04-12 RX ORDER — BUSPIRONE HYDROCHLORIDE 10 MG/1
5 TABLET ORAL EVERY 12 HOURS SCHEDULED
Status: DISCONTINUED | OUTPATIENT
Start: 2023-04-12 | End: 2023-04-13 | Stop reason: HOSPADM

## 2023-04-12 RX ORDER — HYDROMORPHONE HYDROCHLORIDE 1 MG/ML
0.5 INJECTION, SOLUTION INTRAMUSCULAR; INTRAVENOUS; SUBCUTANEOUS EVERY 8 HOURS PRN
Status: COMPLETED | OUTPATIENT
Start: 2023-04-12 | End: 2023-04-13

## 2023-04-12 RX ADMIN — NICOTINE POLACRILEX 2 MG: 2 LOZENGE ORAL at 01:08

## 2023-04-12 RX ADMIN — LORAZEPAM 0.25 MG: 0.5 TABLET ORAL at 18:06

## 2023-04-12 RX ADMIN — NICOTINE POLACRILEX 2 MG: 2 LOZENGE ORAL at 22:13

## 2023-04-12 RX ADMIN — KETOROLAC TROMETHAMINE 30 MG: 30 INJECTION, SOLUTION INTRAMUSCULAR; INTRAVENOUS at 02:47

## 2023-04-12 RX ADMIN — LORAZEPAM 0.25 MG: 0.5 TABLET ORAL at 10:43

## 2023-04-12 RX ADMIN — Medication 10 ML: at 09:19

## 2023-04-12 RX ADMIN — HYDROMORPHONE HYDROCHLORIDE 0.5 MG: 1 INJECTION, SOLUTION INTRAMUSCULAR; INTRAVENOUS; SUBCUTANEOUS at 13:08

## 2023-04-12 RX ADMIN — KETOROLAC TROMETHAMINE 30 MG: 30 INJECTION, SOLUTION INTRAMUSCULAR; INTRAVENOUS at 13:08

## 2023-04-12 RX ADMIN — KETOROLAC TROMETHAMINE 30 MG: 30 INJECTION, SOLUTION INTRAMUSCULAR; INTRAVENOUS at 22:12

## 2023-04-12 RX ADMIN — HYDROCORTISONE 1 APPLICATION: 1 CREAM TOPICAL at 09:26

## 2023-04-12 RX ADMIN — BUSPIRONE HYDROCHLORIDE 5 MG: 10 TABLET ORAL at 22:12

## 2023-04-12 RX ADMIN — OXYCODONE HYDROCHLORIDE AND ACETAMINOPHEN 1 TABLET: 10; 325 TABLET ORAL at 10:43

## 2023-04-12 RX ADMIN — NICOTINE POLACRILEX 2 MG: 2 LOZENGE ORAL at 00:02

## 2023-04-12 RX ADMIN — HYDROMORPHONE HYDROCHLORIDE 0.5 MG: 1 INJECTION, SOLUTION INTRAMUSCULAR; INTRAVENOUS; SUBCUTANEOUS at 22:13

## 2023-04-12 RX ADMIN — HYDROCORTISONE 1 APPLICATION: 1 CREAM TOPICAL at 22:14

## 2023-04-12 RX ADMIN — OXYCODONE HYDROCHLORIDE AND ACETAMINOPHEN 1 TABLET: 10; 325 TABLET ORAL at 18:06

## 2023-04-12 RX ADMIN — Medication 10 ML: at 22:22

## 2023-04-12 RX ADMIN — NICOTINE POLACRILEX 2 MG: 2 LOZENGE ORAL at 13:08

## 2023-04-12 RX ADMIN — NICOTINE POLACRILEX 2 MG: 2 LOZENGE ORAL at 10:46

## 2023-04-12 NOTE — CASE MANAGEMENT/SOCIAL WORK
Continued Stay Note  [facility]     Patient Name: Robson Miller  MRN: [id number]  Today's Date: 4/12/2023    Admit Date: 3/30/2023    Plan: Home   Discharge Plan     Row Name 04/12/23 1145       Plan    Plan Home    Patient/Family in Agreement with Plan yes    Plan Comments Spoke with patient at bedside. Patient has telepsych at 2:00 pm today. Discharge plan is home and states that he has a ride. CM will follow.    Final Discharge Disposition Code 01 - home or self-care               Discharge Codes    No documentation.               Expected Discharge Date and Time     Expected Discharge Date Expected Discharge Time    Apr 13, 2023             Josefina Cage RN

## 2023-04-12 NOTE — CONSULTS
This provider is located at the [facility] (through [facility] [address], [address] using a secure video visit through [employer]. The patient is being seen remotely via telehealth at [facility], [address], [place] [address]. The patient's condition being consulted/diagnosed/treated is appropriate for telemedicine. The provider identified herself as well as her credentials.   The patient, and/or patients guardian, consent to be seen remotely, and when consent is given they understand that the consent allows for patient identifiable information to be sent to a third party as needed.   They may refuse to be seen remotely at any time. The electronic data is encrypted and password protected, and the patient and/or guardian has been advised of the potential risks to privacy not withstanding such measures.     The use of a video visit has been reviewed with the patient and verbal informed consent has been obtained.    Patient identifiers utilized: Name and date of birth.    Patient verbally confirmed consent for today's encounter 4/12/23.    Referring Provider: Margaret Hogue MD    Reason for Consultation: Depression    Chief complaint/Focus of Exam: Mood    Subjective    Accompanied by: The patient's father is present at the time of the encounter with verbal consent from the patient for the father to be present.    History of present illness:   Robson Miller is a 32 y.o. male with a past medical history significant for alcohol abuse who presented to the [facility] emergency department with complaints of abdominal pain, and the patient was found to have severe acute pancreatitis.  GI was consulted and has been assisting with management, the patient has met with the chemical dependency team, and a psychiatric consult was ordered due to reports of depression.  It is documented that the patient has not expressed any suicidal  ideations, but the patient's father has been concerned that the patient's life is in danger from alcohol abuse and longstanding severe depression.  At the time of today's encounter the patient is alert and oriented to person, place, and time.  The patient reports a past psychiatric history significant for depression and anxiety.  The patient reports he has been on numerous psychotropic medications in the past, and reports the only medications he can remember at this time that he has tried are Abilify, Cymbalta, Celexa, Lyrica, Zoloft, Prozac, and Trazodone.  The patient reports he knows he has taken numerous other psychotropic medications, but cannot remember the names of those at this time.  The patient reports prior outpatient psychiatric treatment when he was living in [place], but none since living in [place].  The patient reports 1 prior psychiatric hospitalization for approximately 9 days when he was around 19 years of age.  The patient reports he was admitted against his will due to depression and suicidal ideations at this hospitalization.  The patient reports a history of self-injurious behaviors including cutting, dragging paperclips across his arm, and twisting bottle caps into the skin.  The patient reports he has not participated in this type of self-harm in many years.  The patient reports he does feel that his alcohol abuse may also be a form of self harm for him.  The patient denies any history of suicide attempts.  The patient reports that he has had passive suicidal thoughts for many years, and these thoughts are intrusive and unwanted.  The patient and father report the patient's great grandfather possibly committed suicide, but they are unsure as they are not sure what happened to this individual.  The patient and father deny any other known family history of suicide attempts.  The patient reports a history of marijuana use in his teenage years, and currently as an adult.  He reports he did not  even drink alcohol until he was 21 years of age, but he did not start drinking heavily until around the age of 24 years old, and he reports he does not feel he became an actual alcoholic until around the age of 26 years old.  The patient denies any other history of substance use/misuse/abuse.  The patient reports a significant family history of alcohol abuse including in his father, two uncles, and both grandfathers.  The patient reports he has been dealing with different situational stressors in his life, but upon reflection realizes that the majority of his worsened moods and depressive symptoms developed around the time he started graduate school, and he reports this is when his alcohol use really picked up.  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  The patient reports he has now been sober for 14 days as of today, and he reports he is happier about this than he thought he would be.  The patient reports he has been advised by the GI team that he is not to smoke cigarettes, vape, use marijuana, drink alcohol, or partake in any recreational or illicit substances.  The patient reports he agrees with this partially, but reports marijuana has been the only substance in the past that has really helped his moods.  The patient reports he can be depressed or anxious, and marijuana is what relieves this the quickest and most efficiently.  The patient reports he does plan to speak with the GI providers regarding marijuana use, and is going to inquire if he has been advised to completely abstain from marijuana, or only inhaled forms.  The patient reports regardless of their answer, he plans to continue using marijuana at smaller amounts.  The patient reports he does not plan to drink alcohol or use any other illicit substances, and plans to never touch alcohol again.  The patient is able to verbalize the risks  "of continued alcohol use in his own words including worsening of condition or even death.  The patient reports he has been informed that his pancreatitis was very severe, and he has been in a life or death situation, and he knows going forward if he starts drinking again or develops pancreatitis he could be in another life or death situation.  The patient denies any withdrawal symptoms at today's encounter, and reports he has not experienced any physical withdrawal symptoms for several days now.  The patient reports he is now dealing with depression, anxiety, and insomnia of which he was not prepared for since being sober from alcohol.  The patient reports he has been tolerating the decreases in Ativan and his pain medications, but reports he is concerned when he leaves the hospital if he were to develop pancreatitis again what he would do about pain control, and the patient has been advised to discuss this with his GI specialist and Hospitalist team.  The patient has been advised to follow closely with primary care and gastroenterology after discharge from his current hospital course.  The patient verbalizes understanding and agreement with this plan.  The patient reports he did meet with the chemical dependency nurse, and reports she has provided him with some information regarding AUD treatment, but he reports he declines any type of group therapy or inpatient treatment.  This APRN and the patient have also discussed the possibility of intensive outpatient programs for mental health and substance use, and the patient declines at this time.  The patient reports he does not have time in his schedule with work and completing his Doctoral program to attend [facility], and he also reports he will not attend any type of group therapy.  This APRN and the patient have discussed psychotropic medications to treat his reported moods, and the patient declines any type of \"SSRI's\" or BuSpar.  The patient reports all prior SSRI's or " "antidepressant medications have either worsened his moods, were ineffective, or made him feel numb.  This APRN and the patient have also discussed gene sight testing, and the patient reports he refuses to submit his DNA to any company, and would absolutely not consider gene sight testing in the future.  The patient reports he has been administered hydroxyzine 25 mg to help with anxiety and sleep.  He reports the hydroxyzine did not do anything to help with his anxiety, but it did help him sleep.  He reports he slept for 8 to 9 hours, and woke up feeling groggy the next morning, so he does not like taking hydroxyzine.  The patient reports he has talked with the Hospitalist services and he would be willing to go home with a prescription for hydroxyzine to take as needed for anxiety or sleep since it did help with sleep, and reports because it made him feel groggy the next morning he would be willing to try a lower dosage of hydroxyzine such as 10 mg by mouth as needed for his anxiety and/or sleeping difficulties, but he reports he does not think he will like taking this medication or utilize it often.  The patient denies any auditory or visual hallucinations.  The patient denies any active suicidal or homicidal ideations, plans, or intent at the time of this encounter.  The patient reports a history frequent passive suicidal ideations for many years which are unwanted intrusive thoughts.  He reports he wants to live, and reports he is trying to do everything he can to \"choose life right now\".  The patient is able to list protective factors against suicide, and also lists future planning for completing his doctoral program.  The patient reports he does not like to burden people, but is able to list several different individuals who would be a support for him if there was any worsening of his moods including several close friends, his girlfriend, his father, and some friends who are a psychologist and a psychiatrist.  " "The patient reports he has a plan mapped out of how he would like to proceed with further treatment regarding his mental health.  The patient declines any inpatient or outpatient AUD treatments at this time.  The patient declines any psychotropic medications other than as needed hydroxyzine, he reports he is willing to pursue individual therapy, and he reports he is willing to follow closely with behavioral health for psychotherapy and to discuss possible future psychotropic medications, but declines any psychotropic medications at the time of this encounter other than the as needed hydroxyzine although he reports he sees \"no therapeutic benefit\" from hydroxyzine.  The patient reports he is willing to follow with behavioral health closely after medical stabilization and discharge from his current hospital course, and reports he is willing to consider future psychotropic medications if his current chosen treatment path does not provide benefit, and would even possibly consider an intensive outpatient program if his current plans are ineffective, but not at this time.  The patient reports his heart rate seems to have been sporadic, and has been varying by approximately 50 bpm over short periods of time, and reports he plans to discuss this, his pain management concerns, and the continued use of marijuana when he is discharged from the hospital with his Hospitalist treatment team and gastroenterology.  The patient reports within the week after being discharged from the hospital he will be going to [place] for the summer, and reports he plans to follow with behavioral health in [place] until he returns to [place], and then he will follow with behavioral health in [place].  Recommend case management to provide the patient with local in person behavioral health services in the [place], [place] area, as the patient requested in person services.        History    Past Medical History:   Diagnosis Date   • Alcohol abuse " "     Past Surgical History:   Procedure Laterality Date   • WRIST FRACTURE SURGERY       Family History   Problem Relation Age of Onset   • Other Mother    • Transient ischemic attack Father      Social History     Socioeconomic History   • Marital status: Single   Tobacco Use   • Smoking status: Every Day     Packs/day: 0.25     Years: 15.00     Pack years: 3.75     Types: Cigarettes   Vaping Use   • Vaping Use: Never used   Substance and Sexual Activity   • Alcohol use: Yes     Comment: HALF A FIFTH OR MORE DAILY FOR THE PAST COUPLE MONTHS.    • Drug use: Yes     Types: Marijuana     Comment: DAILY         Problem List:  Patient Active Problem List   Diagnosis   • Alcohol withdrawal   • Lactic acidosis   • Severe acute pancreatitis       Allergy:   Allergies   Allergen Reactions   • Ceclor [Cefaclor] Other (See Comments)     \" I DONT KNOW I WAS AN INFANT WHEN I GOT IT\"    • Doxycycline Other (See Comments)     \"MAKES ME FEEL VERY ILL\"   • Penicillins Other (See Comments)     \"I DO NOT KNOW I WAS GIVEN THIS AS A CHILD\"    • Sulfa Antibiotics Other (See Comments)     \" I DONT KNOW I WAS AN INFANT WHEN I WAS GIVEN IT\"         Current Medications:   Current Facility-Administered Medications   Medication Dose Route Frequency Provider Last Rate Last Admin   • acetaminophen (TYLENOL) tablet 650 mg  650 mg Oral Q4H PRN Arielle Ramos MD   650 mg at 04/11/23 1307    Or   • acetaminophen (TYLENOL) 160 MG/5ML solution 650 mg  650 mg Oral Q4H PRN Arielle Ramos MD        Or   • acetaminophen (TYLENOL) suppository 650 mg  650 mg Rectal Q4H PRN Arielle Ramos MD       • sennosides-docusate (PERICOLACE) 8.6-50 MG per tablet 2 tablet  2 tablet Oral BID Poornima Weber DO   2 tablet at 04/11/23 1049    And   • polyethylene glycol (MIRALAX) packet 17 g  17 g Oral Daily PRN Poornima Weber DO   17 g at 04/05/23 1137    And   • bisacodyl (DULCOLAX) EC tablet 5 mg  5 mg Oral Daily PRN Poornima Weber DO   5 mg at " 04/06/23 2240    And   • bisacodyl (DULCOLAX) suppository 10 mg  10 mg Rectal Daily PRN Poornima Weber DO   10 mg at 04/05/23 2136   • Calcium Replacement - Follow Nurse / BPA Driven Protocol   Does not apply PRN Arielle Ramos MD       • cloNIDine (CATAPRES) tablet 0.1 mg  0.1 mg Oral Q6H PRN Arielle Ramos MD   0.1 mg at 04/09/23 1639   • hydrocortisone 1 % cream 1 application  1 application Topical Q12H Arielle Ramos MD   1 application at 04/12/23 0926   • HYDROmorphone (DILAUDID) injection 0.5 mg  0.5 mg Intravenous Q8H PRN Phyllis Thompson MD   0.5 mg at 04/12/23 1308   • hydrOXYzine (ATARAX) tablet 25 mg  25 mg Oral TID PRN Margaret Hogue MD       • ketorolac (TORADOL) injection 30 mg  30 mg Intravenous Q6H PRN Margaret Hogue MD   30 mg at 04/12/23 1308   • LORazepam (ATIVAN) tablet 0.25 mg  0.25 mg Oral Q6H PRN Margaret Hogue MD   0.25 mg at 04/12/23 1806   • Magnesium Standard Dose Replacement - Follow Nurse / BPA Driven Protocol   Does not apply PRN Arielle Ramos MD       • nicotine polacrilex (COMMIT) lozenge 2 mg  2 mg Mouth/Throat Q1H PRN Arielle Ramos MD   2 mg at 04/12/23 1308   • ondansetron (ZOFRAN) tablet 4 mg  4 mg Oral Q6H PRN Poornima Weber DO   4 mg at 04/01/23 2113    Or   • ondansetron (ZOFRAN) injection 4 mg  4 mg Intravenous Q6H PRN Poornima Weber DO   4 mg at 04/06/23 0220   • oxyCODONE-acetaminophen (PERCOCET)  MG per tablet 1 tablet  1 tablet Oral Q6H PRN Margaret Hogue MD   1 tablet at 04/12/23 1806   • Phosphorus Replacement - Follow Nurse / BPA Driven Protocol   Does not apply PRN Arielle Ramos MD       • polyethylene glycol (MIRALAX) packet 17 g  17 g Oral BID Margaret Hogue MD   17 g at 04/09/23 0913   • Potassium Replacement - Follow Nurse / BPA Driven Protocol   Does not apply PRN Arielle Ramos MD       • prochlorperazine (COMPAZINE) injection 5 mg  5 mg Intravenous Q6H PRN Enmanuel Rangel PA   5 mg at 03/30/23 0837   • sodium chloride  "0.9 % flush 10 mL  10 mL Intravenous Q12H Poornima Weber DO   10 mL at 04/12/23 0919   • sodium chloride 0.9 % flush 10 mL  10 mL Intravenous PRN Poornima Weber DO       • sodium chloride 0.9 % infusion 40 mL  40 mL Intravenous PRN Poornima Weber DO             Review of Systems  Gen- No fevers, no chills  CV- No chest pain, no palpitations  Resp- No cough, no dyspnea  GI- No vomiting, + diarrhea, + abd pain decreased, + nausea decreased  Psych- + depression, + anxiety, no suicidal ideations, no homicidal ideations, no AVH        Objective     Physical Exam:   Blood pressure (!) 142/118, pulse 101, temperature 97.6 °F (36.4 °C), temperature source Oral, resp. rate 18, height 172.7 cm (68\"), weight 74.8 kg (165 lb), SpO2 96 %. Body mass index is 25.09 kg/m².     Vitals:    04/12/23 0300 04/12/23 0717 04/12/23 1140 04/12/23 1614   BP: 111/60 99/56 125/89 (!) 142/118   BP Location: Right arm Right arm Left arm Left arm   Patient Position: Lying Lying Sitting Sitting   Pulse: 95 76 106 101   Resp: 16 18 18 18   Temp: 96.2 °F (35.7 °C)  97.6 °F (36.4 °C)    TempSrc: Oral  Oral    SpO2:       Weight:       Height:              Mental Status Exam:  Hygiene:   good  Cooperation:  Cooperative  Eye Contact:  Good  Psychomotor Behavior:  Appropriate  Affect:  Appropriate   Mood: Depressed and anxious  Hopelessness: Denies  Speech:  Normal  Thought Progress:  Linear  Thought Content:  Mood congruent  Suicidal:  None  Homicidal:  None  Hallucinations:  None  Delusion:  None  Memory:  Intact  Orientation:  Person, Place and Time  Reliability:  good  Insight:  Fair  Judgement:  Impaired  Impulse Control:  Impaired        Results Review:  Lab Results (last 24 hours)     Procedure Component Value Units Date/Time    Comprehensive Metabolic Panel [[id number]]  (Abnormal) Collected: 04/12/23 0519    Specimen: Blood Updated: 04/12/23 0619     Glucose 121 mg/dL      BUN 6 mg/dL      Creatinine 0.76 mg/dL      Sodium 136 " mmol/L      Potassium 3.5 mmol/L      Chloride 102 mmol/L      CO2 24.0 mmol/L      Calcium 9.3 mg/dL      Total Protein 6.7 g/dL      Albumin 3.7 g/dL      ALT (SGPT) 33 U/L      AST (SGOT) 22 U/L      Alkaline Phosphatase 157 U/L      Total Bilirubin 0.2 mg/dL      Globulin 3.0 gm/dL      Comment: Calculated Result        A/G Ratio 1.2 g/dL      BUN/Creatinine Ratio 7.9     Anion Gap 10.0 mmol/L      eGFR 122.5 mL/min/1.73     Narrative:      GFR Normal >60  Chronic Kidney Disease <60  Kidney Failure <15          Imaging Results (Last 24 Hours)     ** No results found for the last 24 hours. **            Assessment & Plan   Problems Addressed this Visit        Mental Health    Alcohol withdrawal   Other Visit Diagnoses     Alcohol-induced acute pancreatitis, unspecified complication status    -  Primary    Hyponatremia          Diagnoses       Codes Comments    Alcohol-induced acute pancreatitis, unspecified complication status    -  Primary ICD-10-CM: K85.20  ICD-9-CM: 577.0     Alcohol withdrawal syndrome with complication     ICD-10-CM: F10.939  ICD-9-CM: 291.81     Hyponatremia     ICD-10-CM: E87.1  ICD-9-CM: 276.1           Visit Diagnoses:    ICD-10-CM ICD-9-CM   1. Alcohol-induced acute pancreatitis, unspecified complication status  K85.20 577.0   2. Alcohol withdrawal syndrome with complication  F10.939 291.81   3. Hyponatremia  E87.1 276.1       Principal Problem:    Severe acute pancreatitis  Active Problems:    Alcohol withdrawal    Lactic acidosis             -Alcohol use disorder  -Major depressive disorder recurrent, severe, without psychotic features  -Anxiety    Recommendations:  -At today's encounter the patient is alert and oriented to person, place, and time.  The patient denies any auditory or visual hallucinations.  The patient does not endorse any symptoms significant for brianna or psychosis.  The patient reports a history of passive suicidal ideations for many years, and reports these  "thoughts are intrusive and unwanted.  At the time of today's encounter the patient denies any active suicidal or homicidal ideations, plans, or intent.  The patient reports he has a plan mapped out of how he would like to proceed with further treatment regarding his mental health.  The patient declines any inpatient or outpatient AUD treatments at this time.  The patient declines any psychotropic medications other than as needed hydroxyzine, he reports he is willing to pursue individual therapy, and he reports he is willing to follow closely with behavioral health for psychotherapy and to discuss possible future psychotropic medications if he changes his mind, but declines any psychotropic medications at the time of this encounter other than the as needed hydroxyzine.  The patient reports hydroxyzine did make him feel groggy the next morning at 25 mg, but is willing to try hydroxyzine at 10 mg by mouth once daily as needed for anxiety and/or sleeping difficulties, even though he reports he does not see any \"therapeutic benefit\" with hydroxyzine and does not foresee himself utilizing this medication.  The patient reports he is willing to follow with behavioral health closely after medical stabilization and discharge from his current hospital course, which is recommended.  He reports he is willing to consider future psychotropic medications if his current chosen treatment path does not provide benefit, and would even possibly consider an intensive outpatient program if his current plans are ineffective, but not at this time.  The patient reports his heart rate seems to have been sporadic, and has been varying by approximately 50 bpm over short periods of time, and reports he plans to discuss this, his pain management concerns, and the continued use of marijuana when he is discharged from the hospital with his Hospitalist treatment team and gastroenterology.  It is recommended for the patient to completely abstain from " all recreational and illicit substances, including alcohol.  The patient reports within the week after being discharged from the hospital he will be going to [place] for the summer, and reports he plans to follow with behavioral health in [place] until he returns to [place], and then he will follow with behavioral health in [place].  Recommend/please have case management provide the patient with local in person behavioral health services in the [place], [place] area, as the patient has reported he prefers in person services.  -The patient's condition could change at any time, please re-consult as needed.        I discussed the patient's findings and my recommendations with patient and family      Thank you for this consultation and allowing us to assist in the care of your patient.  If you have any further questions or concerns, please contact me at [phone].               ANAID Benntet  04/12/23  18:59 EDT      Please note that portions of this note were completed with a voice recognition program.

## 2023-04-12 NOTE — PROGRESS NOTES
"GI Daily Progress Note  Subjective:    Pt sitting up in bed, eating breakfast. Pt ate entire breakfast tray, now reports epigastric pain. He denies further nausea, vomiting since time of admission. Pt has multiple questions about diet, lifestyle modifications to decrease risk of recurrent pancreatitis. LFTs this morning essentially markedly improved.     Objective:    BP 99/56 (BP Location: Right arm, Patient Position: Lying)   Pulse 76   Temp 96.2 °F (35.7 °C) (Oral)   Resp 18   Ht 172.7 cm (68\")   Wt 74.8 kg (165 lb)   SpO2 96%   BMI 25.09 kg/m²     Physical Exam  Vitals and nursing note reviewed.   Constitutional:       General: He is not in acute distress.     Appearance: Normal appearance. He is not ill-appearing or diaphoretic.   HENT:      Head: Normocephalic and atraumatic.      Right Ear: External ear normal.      Left Ear: External ear normal.      Nose: Nose normal.      Mouth/Throat:      Mouth: Mucous membranes are moist.      Pharynx: Oropharynx is clear.   Eyes:      Conjunctiva/sclera: Conjunctivae normal.      Pupils: Pupils are equal, round, and reactive to light.   Cardiovascular:      Rate and Rhythm: Normal rate and regular rhythm.      Pulses: Normal pulses.      Heart sounds: Normal heart sounds.   Pulmonary:      Effort: Pulmonary effort is normal.      Breath sounds: Normal breath sounds.   Abdominal:      General: Abdomen is flat. There is no distension.      Tenderness: There is abdominal tenderness (mild distractible epigastric tenderness). There is no guarding or rebound.   Musculoskeletal:         General: Normal range of motion.      Cervical back: Normal range of motion.   Skin:     General: Skin is warm and dry.   Neurological:      General: No focal deficit present.      Mental Status: He is alert and oriented to person, place, and time.   Psychiatric:         Mood and Affect: Mood normal.         Lab  Lab Results   Component Value Date    WBC 9.86 04/10/2023    HGB 13.6 " 04/10/2023    HGB 14.0 04/08/2023    HGB 14.4 04/06/2023    .9 (H) 04/10/2023     (H) 04/10/2023       Lab Results   Component Value Date    GLUCOSE 121 (H) 04/12/2023    BUN 6 04/12/2023    CREATININE 0.76 04/12/2023    EGFRIFNONA 97 11/20/2020    EGFRIFAFRI 118 11/20/2020    BCR 7.9 04/12/2023     04/12/2023    K 3.5 04/12/2023    CO2 24.0 04/12/2023    CALCIUM 9.3 04/12/2023    ALBUMIN 3.7 04/12/2023    ALKPHOS 157 (H) 04/12/2023    BILITOT 0.2 04/12/2023    ALT 33 04/12/2023    AST 22 04/12/2023       Assessment and Plan:    Severe acute EtOH pancreatitis, improving  EtOH abuse and dependence  Elevated LFTs, improved   - again, agree with weaning opiates as pt has adequate hunger signals and is tolerating nearly 100% of food trays without recurrent nausea / vomiting   - continue incentive spirometer q 1h while awake   - pt given extensive counseling on importance of tobacco / ecigarette / marijuana / EtOH / high fat avoidance   - pt advised on Mediterranean diet upon discharge    Pt is stable from a GI standpoint. Continue oral diet. He may follow up with [facility] as needed.     Keyla Braun PA-C  04/12/23  11:18 EDT

## 2023-04-12 NOTE — PROGRESS NOTES
[facility] Medicine Services  PROGRESS NOTE    Patient Name: Robson Miller  : 1990  MRN: [id number]    Date of Admission: 3/30/2023  Primary Care Physician: Claudette Ho MD    Subjective   Subjective     CC:  F/U abdominal pain    HPI: Continues to have abdominal pain and anxiety but he is eating more.  He is willing to try some additional medications for anxiety after conversation while in the monitored hospital setting.  Understands that benzodiazepines are not long term solution.    ROS:  Gen- No fevers  GI- As above      Objective   Objective     Vital Signs:   Temp:  [96.2 °F (35.7 °C)-98.1 °F (36.7 °C)] 97.6 °F (36.4 °C)  Heart Rate:  [] 106  Resp:  [16-18] 18  BP: ()/() 125/89     Physical Exam:  Constitutional: No acute distress, awake, alert, sitting up in chair  HENT: NCAT, mucous membranes moist  Respiratory: Respiratory effort normal   Cardiovascular: Regular, mildly tachycardic  Gastrointestinal: Soft, mildly tender  Musculoskeletal: No bilateral ankle edema  Psychiatric: Anxious, cooperative  Neurologic: Speech clear and fluent  Skin: No rashes on exposed surfaces      Results Reviewed:  LAB RESULTS:      Lab 04/10/23  0657 23  0523 23  0832   WBC 9.86 12.35* 12.69*   HEMOGLOBIN 13.6 14.0 14.4   HEMATOCRIT 42.7 42.5 43.8   PLATELETS 854* 766* 673*   NEUTROS ABS  --  8.07*  --    IMMATURE GRANS (ABS)  --  0.32*  --    LYMPHS ABS  --  2.08  --    MONOS ABS  --  1.35*  --    EOS ABS  --  0.37  --    .9* 100.0* 100.7*         Lab 23  0519 04/10/23  0657 23  0523 23  0832   SODIUM 136 137 136 137   POTASSIUM 3.5 4.9 4.8 5.1   CHLORIDE 102 98 98 96*   CO2 24.0 27.0 25.0 25.0   ANION GAP 10.0 12.0 13.0 16.0*   BUN 6 6 5* 4*   CREATININE 0.76 0.81 0.63* 0.67*   EGFR 122.5 120.1 129.6 127.2   GLUCOSE 121* 101* 116* 104*   CALCIUM 9.3 9.8 10.2 10.0         Lab 23  0519 23  0523 23  0832   TOTAL  PROTEIN 6.7 7.1 7.5   ALBUMIN 3.7 3.8 4.0   GLOBULIN 3.0 3.3 3.5   ALT (SGPT) 33 58* 43*   AST (SGOT) 22 82* 66*   BILIRUBIN 0.2 0.3 0.4   ALK PHOS 157* 254* 264*   LIPASE  --  104* 121*                     Brief Urine Lab Results  (Last result in the past 365 days)      Color   Clarity   Blood   Leuk Est   Nitrite   Protein   CREAT   Urine HCG        03/30/23 0910 [place]   Cloudy   Trace   Negative   Positive   100 mg/dL (2+)                 Microbiology Results Abnormal     None          No radiology results from the last 24 hrs        Current medications:  Scheduled Meds:hydrocortisone, 1 application, Topical, Q12H  polyethylene glycol, 17 g, Oral, BID  senna-docusate sodium, 2 tablet, Oral, BID  sodium chloride, 10 mL, Intravenous, Q12H      Continuous Infusions:   PRN Meds:.•  acetaminophen **OR** acetaminophen **OR** acetaminophen  •  senna-docusate sodium **AND** polyethylene glycol **AND** bisacodyl **AND** bisacodyl  •  Calcium Replacement - Follow Nurse / BPA Driven Protocol  •  cloNIDine  •  HYDROmorphone  •  hydrOXYzine  •  ketorolac  •  LORazepam  •  Magnesium Standard Dose Replacement - Follow Nurse / BPA Driven Protocol  •  nicotine polacrilex  •  ondansetron **OR** ondansetron  •  oxyCODONE-acetaminophen  •  Phosphorus Replacement - Follow Nurse / BPA Driven Protocol  •  Potassium Replacement - Follow Nurse / BPA Driven Protocol  •  prochlorperazine  •  sodium chloride  •  sodium chloride    Assessment & Plan   Assessment & Plan     Active Hospital Problems    Diagnosis  POA   • **Severe acute pancreatitis [K85.90]  Unknown   • Thrombocytosis [D75.839]  No   • EtOH dependence [F10.20]  Yes   • Anxiety and depression [F41.9, F32.A]  Yes   • Alcohol withdrawal [F10.939]  Yes   • Lactic acidosis [E87.20]  Unknown      Resolved Hospital Problems   No resolved problems to display.        Brief Hospital Course to date:  Robson Miller is a 32 y.o. male with alcohol abuse who presented due to abdominal pain  and was found to have severe acute pancreatitis.     This patient's problems and plans were partially entered by my partner and updated as appropriate by me 23.    Severe acute pancreatitis due to alcohol  Alcoholic ketoacidosis  Lactic acidosis  -S/P aggressive IV fluids  -GI has seen.  Follow up with [facility] as needed  -Has been counseled extensively on avoidance of EtOH and other substances as well  -Encourage PO pain medications over IV.  Dilaudid set to .  Discussed with patient that PO meds will be tapered at discharge.    Hypomagnesemia  Hypokalemia  -Replacement protocol as needed    Constipation  -Improved    Elevated blood pressure  -No previous diagnosis, not on any home meds  -Likely multifactorial due to pain and alcohol withdrawal, anxiety  -Patient will get BP cuff to monitor at home    Alcohol abuse and withdrawal   Long history of depression and anxiety   -Acute withdrawal is complete.    -Ativan to  tomorrow  -Seen by chemical dependency and given resources.    -Reports trying several SSRIs in the past without success.  Didn't feel like hydroxyzine helped much and made him drowsy but willing to try lower dose after telepsych visit.  Also willing to try low dose buspar in monitored setting so will try tonight.  -We discussed possibility of TCA given his pain issues and depression but with tachycardia will hold off for now.  He really would be best served if he can establish with an outpatient psych provider that he trusts to find the right medication regimen for him long term.  Will get outpatient psych resources for him prior to discharge.    Thrombocytosis  -Suspect related to acute inflammation  -Repeat in AM  -If not improving, may need outpatient referral to hematology for further evaluation    Prepped patient and his father for anticipated discharge home tomorrow.    Total time spent: Time Spent: Time Spent: 50 minutes  Time spent includes time reviewing chart, face-to-face  time, counseling patient/family/caregiver, ordering medications/tests/procedures, communicating with other health care professionals, documenting clinical information in the electronic health record, and coordination of care.        Expected Discharge Location and Transportation: home  Expected Discharge   Expected Discharge Date and Time     Expected Discharge Date Expected Discharge Time    Apr 13, 2023            DVT prophylaxis:  No DVT prophylaxis order currently exists. He did not want to continue lovenox.  Encouraged frequent ambulation which he is doing.         CODE STATUS:   Code Status and Medical Interventions:   Ordered at: 03/30/23 1416     Level Of Support Discussed With:    Patient     Code Status (Patient has no pulse and is not breathing):    CPR (Attempt to Resuscitate)     Medical Interventions (Patient has pulse or is breathing):    Full Support       Phyllis Thompson MD  04/12/23

## 2023-04-12 NOTE — PLAN OF CARE
Goal Outcome Evaluation:      Patient had a lot of anxiety over medications. Discussed timing medications so that he would not be without for long periods of time.  ST.  Room air.

## 2023-04-13 ENCOUNTER — READMISSION MANAGEMENT (OUTPATIENT)
Dept: CALL CENTER | Facility: HOSPITAL | Age: 33
End: 2023-04-13
Payer: COMMERCIAL

## 2023-04-13 VITALS
TEMPERATURE: 96.9 F | BODY MASS INDEX: 25.01 KG/M2 | WEIGHT: 165 LBS | DIASTOLIC BLOOD PRESSURE: 76 MMHG | RESPIRATION RATE: 16 BRPM | SYSTOLIC BLOOD PRESSURE: 131 MMHG | HEART RATE: 80 BPM | OXYGEN SATURATION: 96 % | HEIGHT: 68 IN

## 2023-04-13 PROBLEM — E87.20 LACTIC ACIDOSIS: Status: RESOLVED | Noted: 2023-03-30 | Resolved: 2023-04-13

## 2023-04-13 LAB
BASOPHILS # BLD AUTO: 0.19 10*3/MM3 (ref 0–0.2)
BASOPHILS NFR BLD AUTO: 2.2 % (ref 0–1.5)
DEPRECATED RDW RBC AUTO: 51.6 FL (ref 37–54)
EOSINOPHIL # BLD AUTO: 0.33 10*3/MM3 (ref 0–0.4)
EOSINOPHIL NFR BLD AUTO: 3.7 % (ref 0.3–6.2)
ERYTHROCYTE [DISTWIDTH] IN BLOOD BY AUTOMATED COUNT: 14.1 % (ref 12.3–15.4)
HCT VFR BLD AUTO: 40 % (ref 37.5–51)
HGB BLD-MCNC: 13 G/DL (ref 13–17.7)
IMM GRANULOCYTES # BLD AUTO: 0.06 10*3/MM3 (ref 0–0.05)
IMM GRANULOCYTES NFR BLD AUTO: 0.7 % (ref 0–0.5)
LYMPHOCYTES # BLD AUTO: 2.92 10*3/MM3 (ref 0.7–3.1)
LYMPHOCYTES NFR BLD AUTO: 33.1 % (ref 19.6–45.3)
MCH RBC QN AUTO: 32.4 PG (ref 26.6–33)
MCHC RBC AUTO-ENTMCNC: 32.5 G/DL (ref 31.5–35.7)
MCV RBC AUTO: 99.8 FL (ref 79–97)
MONOCYTES # BLD AUTO: 0.66 10*3/MM3 (ref 0.1–0.9)
MONOCYTES NFR BLD AUTO: 7.5 % (ref 5–12)
NEUTROPHILS NFR BLD AUTO: 4.66 10*3/MM3 (ref 1.7–7)
NEUTROPHILS NFR BLD AUTO: 52.8 % (ref 42.7–76)
NRBC BLD AUTO-RTO: 0 /100 WBC (ref 0–0.2)
PLATELET # BLD AUTO: 862 10*3/MM3 (ref 140–450)
PMV BLD AUTO: 9.3 FL (ref 6–12)
RBC # BLD AUTO: 4.01 10*6/MM3 (ref 4.14–5.8)
WBC NRBC COR # BLD: 8.82 10*3/MM3 (ref 3.4–10.8)

## 2023-04-13 PROCEDURE — 85025 COMPLETE CBC W/AUTO DIFF WBC: CPT | Performed by: INTERNAL MEDICINE

## 2023-04-13 PROCEDURE — 25010000002 KETOROLAC TROMETHAMINE PER 15 MG: Performed by: INTERNAL MEDICINE

## 2023-04-13 PROCEDURE — 25010000002 HYDROMORPHONE PER 4 MG: Performed by: INTERNAL MEDICINE

## 2023-04-13 RX ORDER — ASPIRIN 81 MG/1
81 TABLET ORAL DAILY
Qty: 30 TABLET | Refills: 1 | Status: SHIPPED | OUTPATIENT
Start: 2023-04-13

## 2023-04-13 RX ORDER — BUSPIRONE HYDROCHLORIDE 5 MG/1
5 TABLET ORAL EVERY 12 HOURS SCHEDULED
Qty: 60 TABLET | Refills: 1 | Status: SHIPPED | OUTPATIENT
Start: 2023-04-13

## 2023-04-13 RX ORDER — HYDROXYZINE HYDROCHLORIDE 10 MG/1
10 TABLET, FILM COATED ORAL 3 TIMES DAILY PRN
Qty: 30 TABLET | Refills: 0 | Status: SHIPPED | OUTPATIENT
Start: 2023-04-13

## 2023-04-13 RX ORDER — OXYCODONE AND ACETAMINOPHEN 10; 325 MG/1; MG/1
1 TABLET ORAL EVERY 6 HOURS PRN
Qty: 12 TABLET | Refills: 0 | Status: SHIPPED | OUTPATIENT
Start: 2023-04-13

## 2023-04-13 RX ORDER — ONDANSETRON 4 MG/1
4 TABLET, FILM COATED ORAL EVERY 6 HOURS PRN
Qty: 12 TABLET | Refills: 0 | Status: SHIPPED | OUTPATIENT
Start: 2023-04-13

## 2023-04-13 RX ADMIN — LORAZEPAM 0.25 MG: 0.5 TABLET ORAL at 09:31

## 2023-04-13 RX ADMIN — NICOTINE POLACRILEX 2 MG: 2 LOZENGE ORAL at 12:10

## 2023-04-13 RX ADMIN — BUSPIRONE HYDROCHLORIDE 5 MG: 10 TABLET ORAL at 08:47

## 2023-04-13 RX ADMIN — SENNOSIDES AND DOCUSATE SODIUM 2 TABLET: 8.6; 5 TABLET ORAL at 08:48

## 2023-04-13 RX ADMIN — OXYCODONE HYDROCHLORIDE AND ACETAMINOPHEN 1 TABLET: 10; 325 TABLET ORAL at 00:52

## 2023-04-13 RX ADMIN — HYDROCORTISONE 1 APPLICATION: 1 CREAM TOPICAL at 08:49

## 2023-04-13 RX ADMIN — NICOTINE POLACRILEX 2 MG: 2 LOZENGE ORAL at 00:52

## 2023-04-13 RX ADMIN — KETOROLAC TROMETHAMINE 30 MG: 30 INJECTION, SOLUTION INTRAMUSCULAR; INTRAVENOUS at 12:00

## 2023-04-13 RX ADMIN — HYDROMORPHONE HYDROCHLORIDE 0.5 MG: 1 INJECTION, SOLUTION INTRAMUSCULAR; INTRAVENOUS; SUBCUTANEOUS at 12:00

## 2023-04-13 RX ADMIN — POLYETHYLENE GLYCOL 3350 17 G: 17 POWDER, FOR SOLUTION ORAL at 08:48

## 2023-04-13 RX ADMIN — OXYCODONE HYDROCHLORIDE AND ACETAMINOPHEN 1 TABLET: 10; 325 TABLET ORAL at 09:31

## 2023-04-13 RX ADMIN — LORAZEPAM 0.25 MG: 0.5 TABLET ORAL at 00:52

## 2023-04-13 RX ADMIN — Medication 10 ML: at 08:49

## 2023-04-13 NOTE — CASE MANAGEMENT/SOCIAL WORK
Continued Stay Note  [facility]     Patient Name: Robson Miller  MRN: [id number]  Today's Date: 4/13/2023    Admit Date: 3/30/2023    Plan:    Discharge Plan     Row Name 04/13/23 1208       Plan    Plan SW    Plan Comments SW’er met with patient and dad at bedside. Discussed mental health options. [facility] Address: [address] [place], [place] [address], [place] [address] Phone: [phone]. Appointment with Lily Freed 4/27 at 11 via Tele Health (Med management) and Therapy Jonel Poon 5/8 at 9am. SW’er provided patient with mental health handout resources and Financial Assistance Application. SW'er called [employer] to screen patient for Medicaid.                Discharge Codes    No documentation.               Expected Discharge Date and Time     Expected Discharge Date Expected Discharge Time    Apr 13, 2023             MARBIN Young (Kay)

## 2023-04-13 NOTE — PLAN OF CARE
Goal Outcome Evaluation:      Patient was started on buspar last night.  C/O pain 7/10.  ST while awake.  SR while sleeping.  Room air.   Heat applied to old IV site.   Expected to be dc'd today.

## 2023-04-13 NOTE — DISCHARGE INSTR - APPOINTMENTS
You have an appointment with Claudette Ho MD on April 25, 2023 @ 9:40 AM.   Call them if you have any questions. Phone: [phone]  [facility] [address] [facility] [address]

## 2023-04-13 NOTE — DISCHARGE SUMMARY
[facility] Medicine Services  DISCHARGE SUMMARY    Patient Name: Robson Miller  : 1990  MRN: [id number]    Date of Admission: 3/30/2023  8:17 AM  Date of Discharge:  2023  Primary Care Physician: Claudette Ho MD    Consults     Date and Time Order Name Status Description    4/10/2023 12:07 PM Inpatient Psychiatrist Consult Completed     3/30/2023  2:16 PM Inpatient Gastroenterology Consult Completed           Hospital Course     Presenting Problem:   Alcohol withdrawal [F10.939]    Active Hospital Problems    Diagnosis  POA   • **Severe acute pancreatitis [K85.90]  Yes   • Thrombocytosis [D75.839]  No   • EtOH dependence [F10.20]  Yes   • Anxiety and depression [F41.9, F32.A]  Yes   • Alcohol withdrawal [F10.939]  Yes      Resolved Hospital Problems    Diagnosis Date Resolved POA   • Lactic acidosis [E87.20] 2023 Yes          Hospital Course:  Robson Miller is a 32 y.o. male with alcohol abuse who presented due to abdominal pain and was found to have severe acute pancreatitis.      This patient's problems and plans were partially entered by my partner and updated as appropriate by me 23.     Severe acute pancreatitis due to alcohol  Alcoholic ketoacidosis  Lactic acidosis  -S/P aggressive IV fluids  -GI has seen.  Follow up with [facility] as needed  -Has been counseled extensively on avoidance of EtOH and other substances as well  -Taper off PO pain medication at discharge    Elevated blood pressure  -No previous diagnosis, not on any home meds  -Likely multifactorial due to pain and alcohol withdrawal, anxiety  -Patient will get BP cuff to monitor at home     Alcohol abuse and withdrawal   Long history of depression and anxiety   -Acute withdrawal is complete; managed with benzodiazepines  -Seen by chemical dependency and given resources.    -Reports trying several SSRIs in the past without success.  Didn't feel like hydroxyzine helped much and made  him drowsy but willing to try lower dose after telepsych visit.  Also willing to try low dose buspar in monitored setting so will continue 5mg BID at discharge  -We discussed possibility of TCA given his pain issues and depression but with tachycardia will hold off for now.  He really would be best served if he can establish with an outpatient psych provider that he trusts to find the right medication regimen for him long term.  He will follow up with psychiatry outpatient after discharge.  Resources provided.     Thrombocytosis  -Suspect related to acute inflammation  -Recommend recheck at PCP follow up  -Started on ASA 81mg in the mean time  -If not improving, may need outpatient referral to hematology for further evaluation    Superficial thrombophlebitis  -Continue warm compresses  -Counseled patient if worsening or if he develops drainage, fever, chills, to return to the ED.      Discharge Follow Up Recommendations for outpatient labs/diagnostics:  F/U with PCP within 1 week  Establish care with outpatient psychiatric provider    Day of Discharge     HPI:   He is doing better today.  Excited to go home.  He has some redness and swelling at right IV site which has improved since removal of IV and application of warm compresses.    Review of Systems  Gen- No fevers  GI- Improving diet tolerance      Vital Signs:   Temp:  [96.7 °F (35.9 °C)-97.5 °F (36.4 °C)] 96.9 °F (36.1 °C)  Heart Rate:  [] 80  Resp:  [16] 16  BP: (105-131)/(68-91) 131/76      Physical Exam:  Constitutional: No acute distress, awake, alert, sitting up in chair  HENT: NCAT, mucous membranes moist  Respiratory: Respiratory effort normal   Cardiovascular: RRR  Musculoskeletal: No bilateral ankle edema  Psychiatric: Cooperative  Neurologic: Speech clear  Skin: Thrombophlebitis of right arm IV site    Pertinent  and/or Most Recent Results     LAB RESULTS:      Lab 04/13/23  0442 04/10/23  0657 04/08/23  0523   WBC 8.82 9.86 12.35*   HEMOGLOBIN  13.0 13.6 14.0   HEMATOCRIT 40.0 42.7 42.5   PLATELETS 862* 854* 766*   NEUTROS ABS 4.66  --  8.07*   IMMATURE GRANS (ABS) 0.06*  --  0.32*   LYMPHS ABS 2.92  --  2.08   MONOS ABS 0.66  --  1.35*   EOS ABS 0.33  --  0.37   MCV 99.8* 101.9* 100.0*         Lab 04/12/23  0519 04/10/23  0657 04/08/23  0523   SODIUM 136 137 136   POTASSIUM 3.5 4.9 4.8   CHLORIDE 102 98 98   CO2 24.0 27.0 25.0   ANION GAP 10.0 12.0 13.0   BUN 6 6 5*   CREATININE 0.76 0.81 0.63*   EGFR 122.5 120.1 129.6   GLUCOSE 121* 101* 116*   CALCIUM 9.3 9.8 10.2         Lab 04/12/23  0519 04/08/23  0523   TOTAL PROTEIN 6.7 7.1   ALBUMIN 3.7 3.8   GLOBULIN 3.0 3.3   ALT (SGPT) 33 58*   AST (SGOT) 22 82*   BILIRUBIN 0.2 0.3   ALK PHOS 157* 254*   LIPASE  --  104*                     Brief Urine Lab Results  (Last result in the past 365 days)      Color   Clarity   Blood   Leuk Est   Nitrite   Protein   CREAT   Urine HCG        03/30/23 0910 [place]   Cloudy   Trace   Negative   Positive   100 mg/dL (2+)               Microbiology Results (last 10 days)     ** No results found for the last 240 hours. **          CT Abdomen Pelvis With & Without Contrast    Result Date: 4/4/2023  CT ABDOMEN PELVIS W WO CONTRAST Date of Exam: 4/4/2023 11:54 AM EDT Indication: pancreatitis, persistent symptoms and fevers.. Comparison: CT abdomen and pelvis 3/30/2023 Technique: Axial CT images were obtained of the abdomen and pelvis before and after the uneventful intravenous administration of 90 mL Isovue-300. Sagittal and coronal reconstructions were performed.  Automated exposure control and iterative reconstruction methods were used. Findings: There has been interval development of small bilateral pleural effusions with associated bibasilar atelectasis. Redemonstration of acute interstitial pancreatitis as evidenced by mildly swollen/edematous pancreas with peripancreatic fat stranding and wispy linear nonorganized peripancreatic fluid. No pancreatic ductal dilatation. No  hypoenhancing pancreatic parenchyma to suggest pancreatic necrosis. No evidence of pseudocyst. There is thin linear retroperitoneal fluid inferior to the pancreatic tail (series 3 image 61), similar to prior. No evidence of discrete pancreatic lesion. Pancreatic ductal anatomy is difficult to delineate. The adjacent splenic vein remains patent. Normal appearance of the liver, gallbladder, bile ducts, spleen, adrenal glands, kidneys, ureters, bladder, prostate, and seminal vesicles. No evidence of bowel obstruction or ileus. There is fluid and ingested material within the stomach. There is fluid  throughout the duodenum which otherwise appears unremarkable. There is some formed stool in the right colon with the left colon appearing largely decompressed. No pneumoperitoneum. Unremarkable appearance of the vasculature. The body wall soft tissues are normal. No lymphadenopathy. No acute or suspicious bony findings.     Impression: Similar appearance of acute interstitial pancreatitis. No evidence of pseudocyst. No pancreatic necrosis. Interval development of small bilateral pleural effusions. Electronically Signed: Jorje Hurtado  4/4/2023 12:58 PM EDT  Workstation ID: [id number]    US Gallbladder    Result Date: 4/8/2023  US GALLBLADDER Date of Exam: 4/8/2023 9:44 AM EDT Indication: Acute pancreatitis, elevated LFTs. Comparison: CT abdomen and pelvis 4/7/2023 Technique: Grayscale and color Doppler ultrasound evaluation of the right upper quadrant was performed. Findings: Pancreas appears hypoechoic and edematous consistent with pancreatitis seen on the prior CT exam. No organized fluid collection adjacent to the pancreas within the field-of-view of this exam. The hepatic echogenicity is mildly increased suggesting fatty infiltration. No discrete liver lesion. No perihepatic ascites. There is hepatopedal flow in the portal vein. Visualized hepatic veins are patent. Gallbladder present. Negative for cholelithiasis. No  pericholecystic fluid. Negative for gallbladder wall thickening. Common bile duct measures 6-7 mm. The right kidney measures 11.4 x 4.5 x 5.1 cm. No right-sided hydronephrosis.     Impression: 1. Edema involving pancreas consistent with pancreatitis better assessed on CT. 2. Hepatic steatosis. 3. Common bile duct at the upper limits of normal measuring 6 to 7 mm, stable from CT. 4. Normal gallbladder. Electronically Signed: Sudhir Mathur  4/8/2023 10:28 AM EDT  Workstation ID: [id number]    XR Abdomen KUB    Result Date: 4/7/2023  XR ABDOMEN KUB Date of Exam: 4/7/2023 12:59 PM EDT Indication: Constipation. Comparison: CT abdomen and pelvis 3/30/2023, 4/4/2023; KUB 4/6/2023 Findings: No abnormal small bowel distention is seen. There is stool in the ascending colon and descending colon without abnormal colonic distention. Stable right pelvic calcification is likely a phlebolith.     Impression: Nonobstructive bowel gas pattern. Stool in the ascending and descending colon. Electronically Signed: Delfina Sherman  4/7/2023 1:39 PM EDT  Workstation ID: [id number]    XR Abdomen KUB    Result Date: 4/6/2023  XR ABDOMEN KUB Date of Exam: 4/6/2023 9:38 AM EDT Indication: Constipation. Comparison: CT abdomen and pelvis 4/4/2023. Findings: No abnormal small bowel distention is seen, although there is a nonspecific paucity of gas filled small bowel loops. There is stool in the descending colon. No abnormal calcifications are seen.     Impression: Nonobstructive, nonspecific bowel gas pattern. Stool in the descending colon. Electronically Signed: Delfina Sherman  4/6/2023 10:04 AM EDT  Workstation ID: [id number]                  Plan for Follow-up of Pending Labs/Results: N/A    Discharge Details        Discharge Medications      New Medications      Instructions Start Date   Aspirin Low Dose 81 MG EC tablet  Generic drug: aspirin   81 mg, Oral, Daily      busPIRone 5 MG tablet  Commonly known as: BUSPAR   5 mg, Oral, Every 12 Hours  "Scheduled      hydrOXYzine 10 MG tablet  Commonly known as: ATARAX   10 mg, Oral, 3 Times Daily PRN      ondansetron 4 MG tablet  Commonly known as: ZOFRAN   4 mg, Oral, Every 6 Hours PRN      oxyCODONE-acetaminophen  MG per tablet  Commonly known as: PERCOCET   1 tablet, Oral, Every 6 Hours PRN             Allergies   Allergen Reactions   • Ceclor [Cefaclor] Other (See Comments)     \" I DONT KNOW I WAS AN INFANT WHEN I GOT IT\"    • Doxycycline Other (See Comments)     \"MAKES ME FEEL VERY ILL\"   • Penicillins Other (See Comments)     \"I DO NOT KNOW I WAS GIVEN THIS AS A CHILD\"    • Sulfa Antibiotics Other (See Comments)     \" I DONT KNOW I WAS AN INFANT WHEN I WAS GIVEN IT\"          Discharge Disposition:  Home or Self Care    Diet:  Hospital:No active diet order         CODE STATUS:    Code Status and Medical Interventions:   Ordered at: 03/30/23 1416     Level Of Support Discussed With:    Patient     Code Status (Patient has no pulse and is not breathing):    CPR (Attempt to Resuscitate)     Medical Interventions (Patient has pulse or is breathing):    Full Support       No future appointments.    Additional Instructions for the Follow-ups that You Need to Schedule     Discharge Follow-up with PCP   As directed       Currently Documented PCP:    Claudette Ho MD    PCP Phone Number:    [phone]     Follow Up Details: OPAL Thompson MD  04/13/23      Time Spent on Discharge:  I spent  45  minutes on this discharge activity which included: face-to-face encounter with the patient, reviewing the data in the system, coordination of the care with the nursing staff as well as consultants, documentation, and entering orders.        "

## 2023-04-14 NOTE — OUTREACH NOTE
Prep Survey    Flowsheet Row Responses   [facility] facility patient discharged from? [place]   Is LACE score < 7 ? No   Eligibility Readm Mgmt   Discharge diagnosis Severe acute pancreatitis   Does the patient have one of the following disease processes/diagnoses(primary or secondary)? Other   Does the patient have Home health ordered? No   Is there a DME ordered? No   Prep survey completed? Yes          Diana MENDIOLA - Registered Nurse

## 2023-04-17 ENCOUNTER — READMISSION MANAGEMENT (OUTPATIENT)
Dept: CALL CENTER | Facility: HOSPITAL | Age: 33
End: 2023-04-17
Payer: COMMERCIAL

## 2023-04-17 NOTE — OUTREACH NOTE
Medical Week 1 Survey    Flowsheet Row Responses   [facility] patient discharged from? [place]   Does the patient have one of the following disease processes/diagnoses(primary or secondary)? Other   Week 1 attempt successful? No   Unsuccessful attempts Attempt 1          Sheryl AGUILLON - Registered Nurse

## 2023-04-20 ENCOUNTER — READMISSION MANAGEMENT (OUTPATIENT)
Dept: CALL CENTER | Facility: HOSPITAL | Age: 33
End: 2023-04-20
Payer: COMMERCIAL

## 2023-04-20 NOTE — OUTREACH NOTE
Medical Week 1 Survey    Flowsheet Row Responses   [facility] patient discharged from? [facility]   Does the patient have one of the following disease processes/diagnoses(primary or secondary)? Other   Week 1 attempt successful? No   Unsuccessful attempts Attempt 2          OSWALDO MORALES - Registered Nurse

## 2023-04-25 ENCOUNTER — READMISSION MANAGEMENT (OUTPATIENT)
Dept: CALL CENTER | Facility: HOSPITAL | Age: 33
End: 2023-04-25
Payer: COMMERCIAL

## 2023-04-25 NOTE — OUTREACH NOTE
Medical Week 1 Survey    Flowsheet Row Responses   [facility] patient discharged from? [facility]   Does the patient have one of the following disease processes/diagnoses(primary or secondary)? Other   Week 1 attempt successful? Yes   Call start time 1358   Call end time 1400   Discharge diagnosis Severe acute pancreatitis   Meds reviewed with patient/caregiver? Yes   Is the patient having any side effects they believe may be caused by any medication additions or changes? No   Does the patient have all medications ordered at discharge? Yes   Is the patient taking all medications as directed (includes completed medication regime)? Yes   Does the patient have a primary care provider?  Yes   Does the patient have an appointment with their PCP within 7 days of discharge? Yes   Has the patient kept scheduled appointments due by today? Yes   Has home health visited the patient within 72 hours of discharge? N/A   Psychosocial issues? No   Did the patient receive a copy of their discharge instructions? Yes   Nursing interventions Reviewed instructions with patient   What is the patient's perception of their health status since discharge? Improving   Is the patient/caregiver able to teach back signs and symptoms related to disease process for when to call PCP? Yes   Is the patient/caregiver able to teach back signs and symptoms related to disease process for when to call 911? Yes   Is the patient/caregiver able to teach back the hierarchy of who to call/visit for symptoms/problems? PCP, Specialist, Home health nurse, Urgent Care, ED, 911 Yes   Week 1 call completed? Yes   Revoked No further contact(revokes)-requires comment   Graduated/Revoked comments Pt reports he is doing better and has completed FU with PCP. He states he will be out of state [facility] the rest of summer and will find a local Dr to continue to FU.           OSWALDO MORALES - Registered Nurse